# Patient Record
Sex: MALE | Employment: OTHER | URBAN - METROPOLITAN AREA
[De-identification: names, ages, dates, MRNs, and addresses within clinical notes are randomized per-mention and may not be internally consistent; named-entity substitution may affect disease eponyms.]

---

## 2018-01-08 ENCOUNTER — RADIANT APPOINTMENT (OUTPATIENT)
Dept: GENERAL RADIOLOGY | Facility: CLINIC | Age: 68
End: 2018-01-08
Attending: FAMILY MEDICINE
Payer: COMMERCIAL

## 2018-01-08 ENCOUNTER — OFFICE VISIT (OUTPATIENT)
Dept: FAMILY MEDICINE | Facility: CLINIC | Age: 68
End: 2018-01-08
Payer: COMMERCIAL

## 2018-01-08 VITALS
TEMPERATURE: 98 F | BODY MASS INDEX: 25.48 KG/M2 | SYSTOLIC BLOOD PRESSURE: 145 MMHG | HEART RATE: 67 BPM | DIASTOLIC BLOOD PRESSURE: 83 MMHG | WEIGHT: 178 LBS | OXYGEN SATURATION: 100 % | HEIGHT: 70 IN

## 2018-01-08 DIAGNOSIS — M25.552 HIP PAIN, LEFT: Primary | ICD-10-CM

## 2018-01-08 DIAGNOSIS — R10.32 LEFT GROIN PAIN: ICD-10-CM

## 2018-01-08 DIAGNOSIS — Z12.11 SCREEN FOR COLON CANCER: ICD-10-CM

## 2018-01-08 PROCEDURE — 73502 X-RAY EXAM HIP UNI 2-3 VIEWS: CPT | Mod: FY

## 2018-01-08 PROCEDURE — 99203 OFFICE O/P NEW LOW 30 MIN: CPT | Performed by: FAMILY MEDICINE

## 2018-01-08 NOTE — PROGRESS NOTES
"  SUBJECTIVE:   Jose Morocho is a 67 year old male who presents to clinic today for the following health issues:    Last seen in the clinic > 3 years ago in 2014.     Joint Pain    Onset: x1 week    Description:   Location: groin region- left side  Character: Sharp/ stiffness     Intensity: 8/10 with use of leg.  worsened after sitting for leg periods and going to standing position.     Progression of Symptoms: worse- pain has started to move down thigh    Accompanying Signs & Symptoms:  Other symptoms:weakness of left leg    History:   Previous similar pain: no       Precipitating factors:   Trauma or overuse: YES- possible from exercising- squats    Alleviating factors:  Improved by: ice- mild relief     Therapies Tried and outcome: none          Patient reports a prior history of back pain s/p back surgery about 20-25 years ago. Has been pain free since then. Denies having any back pain at this time but has ongoing left hip pain.   No recent injuries/trauma. Has not taken any OTC analgesics.     HEALTH CARE MAINTENANCE: overdue for a physical and screening tests.     Problem list and histories reviewed & adjusted, as indicated.  Additional history: as documented    Patient Active Problem List   Diagnosis     Hyperlipidemia LDL goal <160     No past surgical history on file.    Social History   Substance Use Topics     Smoking status: Never Smoker     Smokeless tobacco: Never Used     Alcohol use Yes     No family history on file.      No current outpatient prescriptions on file.     No Known Allergies      Reviewed and updated as needed this visit by clinical staff     Reviewed and updated as needed this visit by Provider         ROS:  Constitutional, HEENT, cardiovascular, pulmonary, gi and gu systems are negative, except as otherwise noted.      OBJECTIVE:   BP (P) 122/70  Pulse 67  Temp 98  F (36.7  C) (Tympanic)  Ht 5' 10\" (1.778 m)  Wt 178 lb (80.7 kg)  SpO2 100%  BMI 25.54 kg/m2  Body mass index " is 25.54 kg/(m^2).  GENERAL: healthy, alert and no distress  MS: no gross musculoskeletal defects noted, no edema  Comprehensive back pain exam:  No tenderness, Range of motion not limited by pain, Lower extremity strength functional and equal on both sides.  Lower extremity reflexes within normal limits bilaterally, Lower extremity sensation normal and equal on both sides and Straight leg raise negative bilaterally  GAIT: Antalgic gait    Diagnostic Test Results:  Xray - in process. Will notify patient with results.     ASSESSMENT/PLAN:     Jose was seen today for leg pain.    Diagnoses and all orders for this visit:    Hip pain, left, suspect degenerative arthritis  -     XR Hip Left 2-3 Views  Recommend to take OTC NSAID as needed for pain relief.   Will await Hip x ray results.     Left groin pain- groin strain versus radiating pain from degenerative hip arthritis.   Recommend to take OTC NSAID as needed for pain relief.   Will await Hip x ray results.     Screen for colon cancer  -     Fecal colorectal cancer screen (FIT); Future      Will notify him with results.     Schedule a Physical Exam at earliest convenience.       Teresa Dooley MD  [facility]

## 2018-01-08 NOTE — MR AVS SNAPSHOT
"              After Visit Summary   1/8/2018    Jose Morocho    MRN: [id number]           Patient Information     Date Of Birth          1950        Visit Information        Provider Department      1/8/2018 10:00 AM Teresa Dooley MD [facility]        Today's Diagnoses     Hip pain, left    -  1    Left groin pain        Screen for colon cancer          Care Instructions    Will notify you with x ray results.  In the interim take OTC Aleve as needed for pain relief          Follow-ups after your visit        Follow-up notes from your care team     Return for Physical Exam at earliest convenience.      Future tests that were ordered for you today     Open Future Orders        Priority Expected Expires Ordered    Fecal colorectal cancer screen (FIT) Routine 1/29/2018 4/2/2018 1/8/2018            Who to contact     Normal or non-critical lab and imaging results will be communicated to you by [employer], letter or phone within 4 business days after the clinic has received the results. If you do not hear from us within 7 days, please contact the clinic through MyChart or phone. If you have a critical or abnormal lab result, we will notify you by phone as soon as possible.  Submit refill requests through [employer] or call your pharmacy and they will forward the refill request to us. Please allow 3 business days for your refill to be completed.          If you need to speak with a  for additional information , please call: [phone]             Additional Information About Your Visit        [employer] Information     [employer] lets you send messages to your doctor, view your test results, renew your prescriptions, schedule appointments and more. To sign up, go to [email or web address] . Click on \"Log in\" on the left side of the screen, which will take you to the Welcome page. Then click on \"Sign up Now\" on the right side of the page.     You will be asked to enter the access code " "listed below, as well as some personal information. Please follow the directions to create your username and password.     Your access code is: [email or web address]  Expires: 2018 10:59 AM     Your access code will  in 90 days. If you need help or a new code, please call your [place] clinic or [phone].        Care EveryWhere ID     This is your Care EveryWhere ID. This could be used by other organizations to access your [place] medical records  [id number]        Your Vitals Were     Pulse Temperature Height Pulse Oximetry BMI (Body Mass Index)       67 98  F (36.7  C) (Tympanic) 5' 10\" (1.778 m) 100% 25.54 kg/m2        Blood Pressure from Last 3 Encounters:   18 145/83   14 138/74    Weight from Last 3 Encounters:   18 178 lb (80.7 kg)   14 211 lb (95.7 kg)              We Performed the Following     XR Hip Left 2-3 Views        Primary Care Provider Office Phone # Fax #    [facility] [phone] [id number] [facility] [address]        Equal Access to Services     LIBIA GRIFFITH AH: Hadii aad ku hadasho Soomaali, waaxda luqadaha, qaybta kaalmada adeegyada, sofia carias . So [facility] [phone].    ATENCIÓN: Si habla español, tiene a ramsey disposición servicios gratuitos de asistencia lingüística. Jamieame al [phone].    We comply with applicable federal civil rights laws and [place] laws. We do not discriminate on the basis of race, color, national origin, age, disability, sex, sexual orientation, or gender identity.            Thank you!     Thank you for choosing [facility]  for your care. Our goal is always to provide you with excellent care. Hearing back from our patients is one way we can continue to improve our services. Please take a few minutes to complete the written survey that you may receive in the mail after your visit with us. Thank you!             Your Updated Medication List - Protect others " around you: Learn how to safely use, store and throw away your medicines at www.disposemymeds.org.      Notice  As of 1/8/2018 10:59 AM    You have not been prescribed any medications.

## 2018-03-26 ENCOUNTER — OFFICE VISIT (OUTPATIENT)
Dept: FAMILY MEDICINE | Facility: CLINIC | Age: 68
End: 2018-03-26
Payer: COMMERCIAL

## 2018-03-26 VITALS
OXYGEN SATURATION: 100 % | BODY MASS INDEX: 25.91 KG/M2 | HEART RATE: 62 BPM | WEIGHT: 181 LBS | TEMPERATURE: 96.8 F | HEIGHT: 70 IN | DIASTOLIC BLOOD PRESSURE: 68 MMHG | SYSTOLIC BLOOD PRESSURE: 136 MMHG

## 2018-03-26 DIAGNOSIS — G25.3 MYOCLONIC JERKING: ICD-10-CM

## 2018-03-26 DIAGNOSIS — E55.9 VITAMIN D DEFICIENCY DISEASE: ICD-10-CM

## 2018-03-26 DIAGNOSIS — M75.41 IMPINGEMENT SYNDROME OF SHOULDER REGION, RIGHT: ICD-10-CM

## 2018-03-26 DIAGNOSIS — Z00.00 ENCOUNTER FOR ROUTINE ADULT HEALTH EXAMINATION WITHOUT ABNORMAL FINDINGS: Primary | ICD-10-CM

## 2018-03-26 DIAGNOSIS — H90.3 SENSORINEURAL HEARING LOSS, BILATERAL: ICD-10-CM

## 2018-03-26 DIAGNOSIS — Z23 NEED FOR PNEUMOCOCCAL VACCINE: ICD-10-CM

## 2018-03-26 DIAGNOSIS — Z13.220 LIPID SCREENING: ICD-10-CM

## 2018-03-26 DIAGNOSIS — Z12.11 SPECIAL SCREENING FOR MALIGNANT NEOPLASMS, COLON: ICD-10-CM

## 2018-03-26 DIAGNOSIS — Z11.59 NEED FOR HEPATITIS C SCREENING TEST: ICD-10-CM

## 2018-03-26 LAB
ANION GAP SERPL CALCULATED.3IONS-SCNC: 7 MMOL/L (ref 3–14)
BUN SERPL-MCNC: 22 MG/DL (ref 7–30)
CALCIUM SERPL-MCNC: 9 MG/DL (ref 8.5–10.1)
CHLORIDE SERPL-SCNC: 106 MMOL/L (ref 94–109)
CHOLEST SERPL-MCNC: 182 MG/DL
CO2 SERPL-SCNC: 29 MMOL/L (ref 20–32)
CREAT SERPL-MCNC: 1.09 MG/DL (ref 0.66–1.25)
GFR SERPL CREATININE-BSD FRML MDRD: 67 ML/MIN/1.7M2
GLUCOSE SERPL-MCNC: 105 MG/DL (ref 70–99)
HDLC SERPL-MCNC: 52 MG/DL
LDLC SERPL CALC-MCNC: 115 MG/DL
MAGNESIUM SERPL-MCNC: 2.3 MG/DL (ref 1.6–2.3)
NONHDLC SERPL-MCNC: 130 MG/DL
POTASSIUM SERPL-SCNC: 5.1 MMOL/L (ref 3.4–5.3)
SODIUM SERPL-SCNC: 142 MMOL/L (ref 133–144)
TRIGL SERPL-MCNC: 76 MG/DL
TSH SERPL DL<=0.005 MIU/L-ACNC: 1.6 MU/L (ref 0.4–4)

## 2018-03-26 PROCEDURE — 90670 PCV13 VACCINE IM: CPT | Performed by: INTERNAL MEDICINE

## 2018-03-26 PROCEDURE — G0009 ADMIN PNEUMOCOCCAL VACCINE: HCPCS | Performed by: INTERNAL MEDICINE

## 2018-03-26 PROCEDURE — 99397 PER PM REEVAL EST PAT 65+ YR: CPT | Mod: 25 | Performed by: INTERNAL MEDICINE

## 2018-03-26 PROCEDURE — 84443 ASSAY THYROID STIM HORMONE: CPT | Performed by: INTERNAL MEDICINE

## 2018-03-26 PROCEDURE — 36415 COLL VENOUS BLD VENIPUNCTURE: CPT | Performed by: INTERNAL MEDICINE

## 2018-03-26 PROCEDURE — 86803 HEPATITIS C AB TEST: CPT | Performed by: INTERNAL MEDICINE

## 2018-03-26 PROCEDURE — 83735 ASSAY OF MAGNESIUM: CPT | Performed by: INTERNAL MEDICINE

## 2018-03-26 PROCEDURE — 80048 BASIC METABOLIC PNL TOTAL CA: CPT | Performed by: INTERNAL MEDICINE

## 2018-03-26 PROCEDURE — 80061 LIPID PANEL: CPT | Performed by: INTERNAL MEDICINE

## 2018-03-26 PROCEDURE — 82306 VITAMIN D 25 HYDROXY: CPT | Performed by: INTERNAL MEDICINE

## 2018-03-26 RX ORDER — SODIUM PHOSPHATE,MONO-DIBASIC 19G-7G/118
1 ENEMA (ML) RECTAL DAILY
COMMUNITY

## 2018-03-26 RX ORDER — MULTIPLE VITAMINS W/ MINERALS TAB 9MG-400MCG
1 TAB ORAL DAILY
COMMUNITY

## 2018-03-26 RX ORDER — ASCORBIC ACID 250 MG
250 TABLET,CHEWABLE ORAL DAILY
COMMUNITY

## 2018-03-26 RX ORDER — CHLORAL HYDRATE 500 MG
2 CAPSULE ORAL DAILY
COMMUNITY

## 2018-03-26 ASSESSMENT — ACTIVITIES OF DAILY LIVING (ADL)
CURRENT_FUNCTION: NO ASSISTANCE NEEDED
I_NEED_ASSISTANCE_FOR_THE_FOLLOWING_DAILY_ACTIVITIES:: NO ASSISTANCE IS NEEDED

## 2018-03-26 ASSESSMENT — PAIN SCALES - GENERAL: PAINLEVEL: NO PAIN (0)

## 2018-03-26 NOTE — PROGRESS NOTES
SUBJECTIVE:   Jose Morocho is a 67 year old male who presents for Preventive Visit.  Are you in the first 12 months of your Medicare coverage?  No    Physical   Annual:     Getting at least 3 servings of Calcium per day::  Yes    Bi-annual eye exam::  NO    Dental care twice a year::  NO    Sleep apnea or symptoms of sleep apnea::  Daytime drowsiness    Diet::  Other    Frequency of exercise::  4-5 days/week    Duration of exercise::  15-30 minutes    Taking medications regularly::  Not Applicable    Additional concerns today::  No    Ability to successfully perform activities of daily living: no assistance needed  Home Safety:  Lack of grab bars in the bathroom  Hearing Impairment: difficulty following a conversation in a noisy restaurant or crowded room, difficulty following dialogue in the theater, need to ask people to speak up or repeat themselves and difficulty understanding soft or whispered speech    Right shoulder with lifting.  Lifting weights regularly   Hard to go overt he head.   No tear or snap .  Shoulder some instability . Working out over 1 year now     Semi-retired.   Selling books online     Ticks in the eyes.     Throughout life     Fall risk:  Fallen 2 or more times in the past year?: No  Any fall with injury in the past year?: No    COGNITIVE SCREEN  1) Repeat 3 items (Banana, Sunrise, Chair)    2) Clock draw: NORMAL  3) 3 item recall: Recalls 2 objects   Results: NORMAL clock, 1-2 items recalled: COGNITIVE IMPAIRMENT LESS LIKELY    Mini-CogTM Copyright VALENTINE Jena-Baptiste. Licensed by the author for use in [facility]; reprinted with permission (leodan@.[email or web address]). All rights reserved.        Reviewed and updated as needed this visit by clinical staff         Reviewed and updated as needed this visit by Provider        Social History   Substance Use Topics     Smoking status: Never Smoker     Smokeless tobacco: Never Used     Alcohol use Yes       Alcohol Use 3/26/2018   If you drink alcohol  do you typically have greater than 3 drinks per day OR greater than 7 drinks per week? No   No flowsheet data found.            Today's PHQ-2 Score:   PHQ-2 ( 1999 [employer]) 3/26/2018   Q1: Little interest or pleasure in doing things 0   Q2: Feeling down, depressed or hopeless 0   PHQ-2 Score 0   Q1: Little interest or pleasure in doing things Not at all   Q2: Feeling down, depressed or hopeless Not at all   PHQ-2 Score 0       Do you feel safe in your environment - Yes    Do you have a Health Care Directive?: No: Advance care planning reviewed with patient; information given to patient to review.    Current providers sharing in care for this patient include:   Patient Care Team:  Biju Diane as PCP - General    The following health maintenance items are reviewed in Epic and correct as of today:  Health Maintenance   Topic Date Due     HEPATITIS C SCREENING  10/15/1966     LIPID SCREEN Q5 YR MALE (SYSTEM ASSIGNED)  10/15/1983     COLON CANCER SCREEN (SYSTEM ASSIGNED)  10/15/1998     PNEUMOCOCCAL (1 of 2 - PCV13) 10/15/2013     AORTIC ANEURYSM SCREENING (SYSTEM ASSIGNED)  10/15/2013     FALL RISK ASSESSMENT  09/05/2015     INFLUENZA VACCINE (SYSTEM ASSIGNED)  09/01/2018     ADVANCE DIRECTIVE PLANNING Q5 YRS  09/05/2019     TETANUS IMMUNIZATION (SYSTEM ASSIGNED)  06/01/2021     Labs reviewed in EPIC  BP Readings from Last 3 Encounters:   03/26/18 136/68   01/08/18 (P) 122/70   09/05/14 138/74    Wt Readings from Last 3 Encounters:   03/26/18 181 lb (82.1 kg)   01/08/18 178 lb (80.7 kg)   09/05/14 211 lb (95.7 kg)                  Patient Active Problem List   Diagnosis     Hyperlipidemia LDL goal <160     Past Surgical History:   Procedure Laterality Date     BACK SURGERY  1995    Herniated disk     EYE SURGERY  2011    Lens implants, both eyes     ORTHOPEDIC SURGERY  2000    Meniscus - right knee       Social History   Substance Use Topics     Smoking status: Never Smoker     Smokeless tobacco: Never Used  "    Alcohol use 0.0 oz/week     0 Standard drinks or equivalent per week      Comment: moderate use in the past     Family History   Problem Relation Age of Onset     DIABETES Paternal Grandmother      Hypertension Father      CEREBROVASCULAR DISEASE Father      Other Cancer Mother      Lung, smoker         Current Outpatient Prescriptions   Medication Sig Dispense Refill     ascorbic acid (VITAMIN C) 250 MG CHEW chewable tablet Take 250 mg by mouth daily       glucosamine-chondroitin 500-400 MG CAPS per capsule Take 1 capsule by mouth daily       multivitamin, therapeutic with minerals (MULTI-VITAMIN) TABS tablet Take 1 tablet by mouth daily       fish oil-omega-3 fatty acids 1000 MG capsule Take 2 g by mouth daily       VITAMIN D, CHOLECALCIFEROL, PO Take 1,000 Units by mouth daily       No Known Allergies    Pneumonia Vaccine:Adults age 65+ who have not received previous Pneumovax (PPSV23) or PCV13 as an adult: Should first be given PCV13 AND then should be given PPSV23 6-12 months after PCV13    Review of Systems  Constitutional, HEENT, cardiovascular, pulmonary, gi and gu systems are negative, except as otherwise noted.    OBJECTIVE:   There were no vitals taken for this visit. Estimated body mass index is 25.54 kg/(m^2) as calculated from the following:    Height as of 1/8/18: 5' 10\" (1.778 m).    Weight as of 1/8/18: 178 lb (80.7 kg).  Physical Exam  GENERAL: healthy, alert and no distress  EYES: Eyes grossly normal to inspection, PERRL and conjunctivae and sclerae normal  HENT: ear canals and TM's normal, nose and mouth without ulcers or lesions  NECK: no adenopathy, no asymmetry, masses, or scars and thyroid normal to palpation  RESP: lungs clear to auscultation - no rales, rhonchi or wheezes  CV: regular rate and rhythm, normal S1 S2, no S3 or S4, no murmur, click or rub, no peripheral edema and peripheral pulses strong  ABDOMEN: soft, nontender, no hepatosplenomegaly, no masses and bowel sounds " "normal  MS: no gross musculoskeletal defects noted, no edema  SKIN: no suspicious lesions or rashes  NEURO: Normal strength and tone, mentation intact and speech normal  BACK: no CVA tenderness, no paralumbar tenderness  PSYCH: mentation appears normal, affect normal/bright  LYMPH: no cervical, supraclavicular, axillary, or inguinal adenopathy    ASSESSMENT / PLAN:       ICD-10-CM    1. Encounter for routine adult health examination without abnormal findings Z00.00    2. Special screening for malignant neoplasms, colon Z12.11 Fecal colorectal cancer screen (FIT)   3. Myoclonic jerking G25.3 Basic metabolic panel     TSH with free T4 reflex     Magnesium   4. Vitamin D deficiency disease E55.9 Vitamin D Deficiency   5. Need for pneumococcal vaccine Z23 PNEUMOCOCCAL CONJ VACCINE 13 VALENT IM     VACCINE ADMINISTRATION, INITIAL   6. Lipid screening Z13.220 Lipid panel reflex to direct LDL Fasting   7. Need for hepatitis C screening test Z11.59 Hepatitis C antibody   8. Impingement syndrome of shoulder region, right M75.41 [facility] PT, HAND, AND CHIROPRACTIC REFERRAL   9. Sensorineural hearing loss, bilateral H90.3 AUDIOLOGY ADULT REFERRAL       End of Life Planning:  Patient currently has an advanced directive: No.  I have verified the patient's ablity to prepare an advanced directive/make health care decisions.  Literature was provided to assist patient in preparing an advanced directive.    COUNSELING:  Reviewed preventive health counseling, as reflected in patient instructions       Consider AAA screening for ages 65-75 and smoking history       Regular exercise       Healthy diet/nutrition       Vision screening       Hearing screening       Dental care       Hepatitis C screening       Colon cancer screening        Estimated body mass index is 25.54 kg/(m^2) as calculated from the following:    Height as of 1/8/18: 5' 10\" (1.778 m).    Weight as of 1/8/18: 178 lb (80.7 kg).     reports that he has never smoked. He has " never used smokeless tobacco.      Appropriate preventive services were discussed with this patient, including applicable screening as appropriate for cardiovascular disease, diabetes, osteopenia/osteoporosis, and glaucoma.  As appropriate for age/gender, discussed screening for colorectal cancer, prostate cancer, breast cancer, and cervical cancer. Checklist reviewing preventive services available has been given to the patient.    Reviewed patients plan of care and provided an AVS. The Basic Care Plan (routine screening as documented in Health Maintenance) for Jose meets the Care Plan requirement. This Care Plan has been established and reviewed with the Patient.    ICD-10-CM    1. Encounter for routine adult health examination without abnormal findings Z00.00    2. Special screening for malignant neoplasms, colon Z12.11 Fecal colorectal cancer screen (FIT)   3. Myoclonic jerking G25.3 Basic metabolic panel     TSH with free T4 reflex     Magnesium   4. Vitamin D deficiency disease E55.9 Vitamin D Deficiency   5. Need for pneumococcal vaccine Z23 PNEUMOCOCCAL CONJ VACCINE 13 VALENT IM     VACCINE ADMINISTRATION, INITIAL   6. Lipid screening Z13.220 Lipid panel reflex to direct LDL Fasting   7. Need for hepatitis C screening test Z11.59 Hepatitis C antibody   8. Impingement syndrome of shoulder region, right M75.41 [facility] PT, HAND, AND CHIROPRACTIC REFERRAL   9. Sensorineural hearing loss, bilateral H90.3 AUDIOLOGY ADULT REFERRAL       Counseling Resources:  ATP IV Guidelines  Pooled Cohorts Equation Calculator  Breast Cancer Risk Calculator  FRAX Risk Assessment  ICSI Preventive Guidelines  Dietary Guidelines for Americans, 2010  USDA's MyPlate  ASA Prophylaxis  Lung CA Screening    Damion Garcia MD  [facility]

## 2018-03-26 NOTE — NURSING NOTE
Prior to injection verified patient identity using patient's name and date of birth.  Brea Thurman MA    Due to injection administration, patient instructed to remain in clinic for 15 minutes  afterwards, and to report any adverse reaction to me immediately.  Brea Thurman MA

## 2018-03-26 NOTE — NURSING NOTE
"Chief Complaint   Patient presents with     Physical     Health Maintenance     Fall risk       Initial /68 (BP Location: Right arm, Patient Position: Chair, Cuff Size: Adult Regular)  Pulse 62  Temp 96.8  F (36  C) (Oral)  Ht 5' 10\" (1.778 m)  Wt 181 lb (82.1 kg)  SpO2 100%  BMI 25.97 kg/m2 Estimated body mass index is 25.97 kg/(m^2) as calculated from the following:    Height as of this encounter: 5' 10\" (1.778 m).    Weight as of this encounter: 181 lb (82.1 kg).  Medication Reconciliation: complete   Brea Thurman MA      "

## 2018-03-26 NOTE — MR AVS SNAPSHOT
After Visit Summary   3/26/2018    Jose Morocho    MRN: [id number]           Patient Information     Date Of Birth          1950        Visit Information        Provider Department      3/26/2018 9:00 AM Damion Garcia MD [facility]        Today's Diagnoses     Encounter for routine adult health examination without abnormal findings    -  1    Special screening for malignant neoplasms, colon        Myoclonic jerking        Vitamin D deficiency disease        Need for pneumococcal vaccine        Lipid screening        Need for hepatitis C screening test        Impingement syndrome of shoulder region, right        Sensorineural hearing loss, bilateral          Care Instructions      Preventive Health Recommendations:       Male Ages 65 and over    Yearly exam:             See your health care provider every year in order to  o   Review health changes.   o   Discuss preventive care.    o   Review your medicines if your doctor has prescribed any.    Talk with your health care provider about whether you should have a test to screen for prostate cancer (PSA).    Every 3 years, have a diabetes test (fasting glucose). If you are at risk for diabetes, you should have this test more often.    Every 5 years, have a cholesterol test. Have this test more often if you are at risk for high cholesterol or heart disease.     Every 10 years, have a colonoscopy. Or, have a yearly FIT test (stool test). These exams will check for colon cancer.    Talk to with your health care provider about screening for Abdominal Aortic Aneurysm if you have a family history of AAA or have a history of smoking.  Shots:     Get a flu shot each year.     Get a tetanus shot every 10 years.     Talk to your doctor about your pneumonia vaccines. There are now two you should receive - Pneumovax (PPSV 23) and Prevnar (PCV 13).    Talk to your doctor about a shingles vaccine.     Talk to your doctor about the  hepatitis B vaccine.  Nutrition:     Eat at least 5 servings of fruits and vegetables each day.     Eat whole-grain bread, whole-wheat pasta and brown rice instead of white grains and rice.     Talk to your doctor about Calcium and Vitamin D.   Lifestyle    Exercise for at least 150 minutes a week (30 minutes a day, 5 days a week). This will help you control your weight and prevent disease.     Limit alcohol to one drink per day.     No smoking.     Wear sunscreen to prevent skin cancer.     See your dentist every six months for an exam and cleaning.     See your eye doctor every 1 to 2 years to screen for conditions such as glaucoma, macular degeneration and cataracts.          Follow-ups after your visit        Additional Services     AUDIOLOGY ADULT REFERRAL       Your provider has referred you to: [facility]: [facility] [place] [phone]   [email or web address]    Treatment:  Evaluation & Treatment  Specialty Testing:  Hearing Aid Consultation    Please be aware that coverage of these services is subject to the terms and limitations of your health insurance plan.  Call member services at your health plan with any benefit or coverage questions.      Please bring the following to your appointment:  >>   Any x-rays, CTs or MRIs which have been performed.  Contact the facility where they were done to arrange for  prior to your scheduled appointment.   >>   List of current medications  >>   This referral request   >>   Any documents/labs given to you for this referral            [facility] PT, HAND, AND CHIROPRACTIC REFERRAL       **This order will print in the [facility] Scheduling Office**    Physical Therapy, Hand Therapy and Chiropractic Care are available through:    *[facility]  *[facility]  *[facility]    Call one number to schedule at any of the above locations: [phone].    Your provider has referred you to: Physical  Therapy at [facility] or [facility]    Indication/Reason for Referral: Shoulder Pain  Onset of Illness: 1 year  Therapy Orders: Evaluate and Treat  Special Programs: None  Special Request: None    Jef Fuchs      Additional Comments for the Therapist or Chiropractor:     Please be aware that coverage of these services is subject to the terms and limitations of your health insurance plan.  Call member services at your health plan with any benefit or coverage questions.      Please bring the following to your appointment:    *Your personal calendar for scheduling future appointments  *Comfortable clothing                  Future tests that were ordered for you today     Open Future Orders        Priority Expected Expires Ordered    Fecal colorectal cancer screen (FIT) Routine 4/16/2018 6/18/2018 3/26/2018            Who to contact     If you have questions or need follow up information about today's clinic visit or your schedule please contact [facility] directly at [phone].  Normal or non-critical lab and imaging results will be communicated to you by [employer], letter or phone within 4 business days after the clinic has received the results. If you do not hear from us within 7 days, please contact the clinic through An[employer] or phone. If you have a critical or abnormal lab result, we will notify you by phone as soon as possible.  Submit refill requests through [employer] or call your pharmacy and they will forward the refill request to us. Please allow 3 business days for your refill to be completed.          Additional Information About Your Visit        Typemock Information     [employer] gives you secure access to your electronic health record. If you see a primary care provider, you can also send messages to your care team and make appointments. If you have questions, please call your primary care clinic.  If you do not have a primary care provider, please call [phone] and they will assist you.    "     Care EveryWhere ID     This is your Care EveryWhere ID. This could be used by other organizations to access your [place] medical records  [id number]        Your Vitals Were     Pulse Temperature Height Pulse Oximetry BMI (Body Mass Index)       62 96.8  F (36  C) (Oral) 5' 10\" (1.778 m) 100% 25.97 kg/m2        Blood Pressure from Last 3 Encounters:   03/26/18 136/68   01/08/18 (P) 122/70   09/05/14 138/74    Weight from Last 3 Encounters:   03/26/18 181 lb (82.1 kg)   01/08/18 178 lb (80.7 kg)   09/05/14 211 lb (95.7 kg)              We Performed the Following     AUDIOLOGY ADULT REFERRAL     Basic metabolic panel     Hepatitis C antibody     [facility], HAND, AND CHIROPRACTIC REFERRAL     Lipid panel reflex to direct LDL Fasting     Magnesium     PNEUMOCOCCAL CONJ VACCINE 13 VALENT IM     TSH with free T4 reflex     VACCINE ADMINISTRATION, INITIAL     Vitamin D Deficiency        Primary Care Provider Office Phone # Fax #    [facility] [phone] [address] [facility] [address]        Equal Access to Services     KELLY GRIFFITH : Hadii aad ku hadasho Soomaali, waaxda luqadaha, qaybta kaalmada adepadmajayajulian, sofia carias . So [facility] [phone].    ATENCIÓN: Si habla español, tiene a ramsey disposición servicios gratuitos de asistencia lingüística. [facility] [phone].    We comply with applicable federal civil rights laws and [place] laws. We do not discriminate on the basis of race, color, national origin, age, disability, sex, sexual orientation, or gender identity.            Thank you!     Thank you for choosing [facility]  for your care. Our goal is always to provide you with excellent care. Hearing back from our patients is one way we can continue to improve our services. Please take a few minutes to complete the written survey that you may receive in the mail after your visit with us. Thank you!             Your Updated " Medication List - Protect others around you: Learn how to safely use, store and throw away your medicines at www.disposemymeds.org.          This list is accurate as of 3/26/18  9:36 AM.  Always use your most recent med list.                   Brand Name Dispense Instructions for use Diagnosis    ascorbic acid 250 MG Chew chewable tablet    vitamin C     Take 250 mg by mouth daily        fish oil-omega-3 fatty acids 1000 MG capsule      Take 2 g by mouth daily        glucosamine-chondroitin 500-400 MG Caps per capsule      Take 1 capsule by mouth daily        Multi-vitamin Tabs tablet      Take 1 tablet by mouth daily        VITAMIN D (CHOLECALCIFEROL) PO      Take 1,000 Units by mouth daily

## 2018-03-27 LAB
DEPRECATED CALCIDIOL+CALCIFEROL SERPL-MC: 47 UG/L (ref 20–75)
HCV AB SERPL QL IA: NONREACTIVE

## 2018-03-28 ENCOUNTER — THERAPY VISIT (OUTPATIENT)
Dept: PHYSICAL THERAPY | Facility: CLINIC | Age: 68
End: 2018-03-28
Payer: COMMERCIAL

## 2018-03-28 DIAGNOSIS — M25.511 ACUTE PAIN OF RIGHT SHOULDER: Primary | ICD-10-CM

## 2018-03-28 PROCEDURE — 97161 PT EVAL LOW COMPLEX 20 MIN: CPT | Mod: GP | Performed by: PHYSICAL THERAPIST

## 2018-03-28 PROCEDURE — G8985 CARRY GOAL STATUS: HCPCS | Mod: GP | Performed by: PHYSICAL THERAPIST

## 2018-03-28 PROCEDURE — G8984 CARRY CURRENT STATUS: HCPCS | Mod: GP | Performed by: PHYSICAL THERAPIST

## 2018-03-28 PROCEDURE — 97110 THERAPEUTIC EXERCISES: CPT | Mod: GP | Performed by: PHYSICAL THERAPIST

## 2018-03-28 NOTE — PROGRESS NOTES
[facility] Initial Evaluation  Subjective:  Patient is a 67 year old male presenting with rehab right shoulder hpi.   Jose Morocho is a 67 year old male with a right shoulder condition.  Condition occurred with:  Repetition/overuse.  Condition occurred: during recreation/sport and at home.  This is a new condition  3/26/18 patient received MD orders for right shoulder pain that flared up about 1 month ago but had originally started about 1 year ago when he started doing weight training with kettlebells.  It had been doing pretty well prior to the past month..    Patient reports pain:  Anterior.    Pain is described as sharp and aching and is intermittent and reported as 3/10.  Associated symptoms:  Loss of strength and loss of motion/stiffness. Pain is worse during the day.  Symptoms are exacerbated by using arm overhead, using arm behind back and lifting and relieved by rest.  Since onset symptoms are gradually improving.        General health as reported by patient is good.  Pertinent medical history includes:  History of fractures.  Medical allergies: no.  Other surgeries include:  None reported.  Current medications:  None as reported by the patient.  Current occupation is retired.    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    Barriers include:  None as reported by the patient.    Red flags:  None as reported by the patient.                        Objective:  SHOULDER:   PROM L PROM R AROM L AROM R MMT L MMT R   Flex   180 180 5/5 5-/5   Abd   180 180 5/5 5-/5   Full Can     5/5 5-/5   Empty Can     5/5 5-/5, mild pain   IR 50 (sleeper) 32 (sleeper)   5/5 5/5   ER   85 85 5/5 5-/5   Ext/IR   T6 T10       Scapulothoraic Rhythm: Right scapular dyskinesis, prominent inferior angle and medial border at rest    Palpation: no tenderness to palpation    Special tests:   L R   Impingement     Neer's - -   Hawkin's-Rogers - +   Coracoid Impingement - +   Internal  impingement - -   Labral     Anterior Slide - -   [facility] - -   Biceps      Speed's - -   Rotator Cuff Tear     Drop Arm - -   Belly Press - -   Lift off  - -       System    Physical Exam    General     ROS    Assessment/Plan:    Patient is a 67 year old male with right side shoulder complaints.    Patient has the following significant findings with corresponding treatment plan.                Diagnosis 1:  Right shoulder pain due to impingement    Pain -  hot/cold therapy, manual therapy, self management, education and home program  Decreased ROM/flexibility - manual therapy, therapeutic exercise and home program  Decreased strength - therapeutic exercise, therapeutic activities and home program  Decreased proprioception - neuro re-education, therapeutic activities and home program  Decreased function - therapeutic activities and home program  Impaired posture - neuro re-education and home program    Therapy Evaluation Codes:   1) History comprised of:   Personal factors that impact the plan of care:      None.    Comorbidity factors that impact the plan of care are:      None.     Medications impacting care: None.  2) Examination of Body Systems comprised of:   Body structures and functions that impact the plan of care:      Shoulder.   Activity limitations that impact the plan of care are:      Bathing, Dressing, Lifting and Throwing.  3) Clinical presentation characteristics are:   Stable/Uncomplicated.  4) Decision-Making    Low complexity using standardized patient assessment instrument and/or measureable assessment of functional outcome.  Cumulative Therapy Evaluation is: Low complexity.    Previous and current functional limitations:  (See Goal Flow Sheet for this information)    Short term and Long term goals: (See Goal Flow Sheet for this information)     Communication ability:  Patient appears to be able to clearly communicate and understand verbal and written communication and follow directions  correctly.  Treatment Explanation - The following has been discussed with the patient:   RX ordered/plan of care  Anticipated outcomes  Possible risks and side effects  This patient would benefit from PT intervention to resume normal activities.   Rehab potential is good.    Frequency:  1 X week, once daily  Duration:  for 8 weeks  Discharge Plan:  Achieve all LTG.  Independent in home treatment program.  Reach maximal therapeutic benefit.    Please refer to the daily flowsheet for treatment today, total treatment time and time spent performing 1:1 timed codes.

## 2018-04-04 ENCOUNTER — THERAPY VISIT (OUTPATIENT)
Dept: PHYSICAL THERAPY | Facility: CLINIC | Age: 68
End: 2018-04-04
Payer: COMMERCIAL

## 2018-04-04 DIAGNOSIS — M25.511 ACUTE PAIN OF RIGHT SHOULDER: ICD-10-CM

## 2018-04-04 PROCEDURE — 97112 NEUROMUSCULAR REEDUCATION: CPT | Mod: GP | Performed by: PHYSICAL THERAPIST

## 2018-04-04 PROCEDURE — 97010 HOT OR COLD PACKS THERAPY: CPT | Mod: GP | Performed by: PHYSICAL THERAPIST

## 2018-04-04 PROCEDURE — 97140 MANUAL THERAPY 1/> REGIONS: CPT | Mod: GP | Performed by: PHYSICAL THERAPIST

## 2018-04-04 PROCEDURE — 97110 THERAPEUTIC EXERCISES: CPT | Mod: GP | Performed by: PHYSICAL THERAPIST

## 2018-04-11 ENCOUNTER — THERAPY VISIT (OUTPATIENT)
Dept: PHYSICAL THERAPY | Facility: CLINIC | Age: 68
End: 2018-04-11
Payer: COMMERCIAL

## 2018-04-11 DIAGNOSIS — M25.511 ACUTE PAIN OF RIGHT SHOULDER: ICD-10-CM

## 2018-04-11 PROCEDURE — 97140 MANUAL THERAPY 1/> REGIONS: CPT | Mod: GP | Performed by: PHYSICAL THERAPIST

## 2018-04-11 PROCEDURE — 97112 NEUROMUSCULAR REEDUCATION: CPT | Mod: GP | Performed by: PHYSICAL THERAPIST

## 2018-04-11 PROCEDURE — 97110 THERAPEUTIC EXERCISES: CPT | Mod: GP | Performed by: PHYSICAL THERAPIST

## 2018-04-20 ENCOUNTER — TELEPHONE (OUTPATIENT)
Dept: FAMILY MEDICINE | Facility: CLINIC | Age: 68
End: 2018-04-20

## 2018-04-20 NOTE — TELEPHONE ENCOUNTER
4-20-18 Called and left message to complete and return Fit test      [facility] X-ray tech

## 2018-04-25 ENCOUNTER — THERAPY VISIT (OUTPATIENT)
Dept: PHYSICAL THERAPY | Facility: CLINIC | Age: 68
End: 2018-04-25
Payer: COMMERCIAL

## 2018-04-25 DIAGNOSIS — M25.511 ACUTE PAIN OF RIGHT SHOULDER: ICD-10-CM

## 2018-04-25 PROCEDURE — 97112 NEUROMUSCULAR REEDUCATION: CPT | Mod: GP | Performed by: PHYSICAL THERAPIST

## 2018-04-25 PROCEDURE — 97110 THERAPEUTIC EXERCISES: CPT | Mod: GP | Performed by: PHYSICAL THERAPIST

## 2018-05-16 ENCOUNTER — TELEPHONE (OUTPATIENT)
Dept: GENERAL RADIOLOGY | Facility: CLINIC | Age: 68
End: 2018-05-16

## 2018-05-16 NOTE — TELEPHONE ENCOUNTER
5-16-18 Called and left message to complete and return Fit test      [facility] X-ray tech

## 2018-05-23 ENCOUNTER — THERAPY VISIT (OUTPATIENT)
Dept: PHYSICAL THERAPY | Facility: CLINIC | Age: 68
End: 2018-05-23
Payer: COMMERCIAL

## 2018-05-23 DIAGNOSIS — M25.511 ACUTE PAIN OF RIGHT SHOULDER: ICD-10-CM

## 2018-05-23 PROCEDURE — 97110 THERAPEUTIC EXERCISES: CPT | Mod: GP | Performed by: PHYSICAL THERAPIST

## 2018-05-23 PROCEDURE — 97112 NEUROMUSCULAR REEDUCATION: CPT | Mod: GP | Performed by: PHYSICAL THERAPIST

## 2018-05-23 NOTE — LETTER
[facility]  [address]  [place] [address]  [phone]    May 23, 2018    Re: Jose Morocho   :   1950  MRN:  [id number]   REFERRING PHYSICIAN:   [facility]    Date of Initial Evaluation:  3/28/18  Visits:  Rxs Used: 5  Reason for Referral:  Acute pain of right shoulder    DISCHARGE REPORT  Progress reporting period is from 3/28/18 to 18.       SUBJECTIVE  Subjective changes noted by patient: Jose reports his shoulder overall is better than it was before starting PT as he has resumed the majority of his kettle bell workouts.  He has, however, had to modify his workouts somewhat as certain positions continue to cause pain in his clavicle area.  He feels stronger and more stable in his posterior shoulder and in the sholder joint, but the clavicle and SC joint continue to feel pain when he puts weight on his right arm if it is out to the side.  He feels he can continue his home program on his own moving forward.    Current pain level is  1/10.     Previous pain level was   3/10.   Changes in function:  Yes (See Goal flowsheet attached for changes in current functional level)  Adverse reaction to treatment or activity: None    OBJECTIVE  Changes noted in objective findings:  Yes  Right shoulder AROM full in all planes, mild clavicle soreness at end range flexion.    Right shoulder passive IR:  65 on right vs 75 on left    Right shoulder strength 5/5 in all planes.    Right inferior scapular angle prominent with hands by sides and on hips.     ASSESSMENT/PLAN  Updated problem list and treatment plan: Diagnosis 1:  R shoulder pain    Pain -  home program  Decreased ROM/flexibility - home program  Decreased strength - home program  Decreased proprioception - home program  Decreased function - home program  STG/LTGs have been met or progress has been made towards goals:  Yes (See Goal flow sheet completed today.)  Assessment of Progress: The  patient's condition is improving.  The patient's condition has potential to improve.  Self Management Plans:  Patient has been instructed in a home treatment program.  Patient  has been instructed in self management of symptoms.    Jose continues to require the following intervention to meet STG and LTG's:  HEP    Recommendations:  This patient is ready to be discharged from therapy and continue their home treatment program.    Thank you for your referral.    INQUIRIES  Therapist: Saturnino Bazan DPT, [facility]  [address]  [place] [address]  Phone: [phone]  Fax: [phone]

## 2018-05-23 NOTE — PROGRESS NOTES
Subjective:  HPI                    Objective:  System    Physical Exam    General     ROS    Assessment/Plan:    DISCHARGE REPORT    Progress reporting period is from 3/28/18 to 5/23/18.       SUBJECTIVE  Subjective changes noted by patient: Jose reports his shoulder overall is better than it was before starting PT as he has resumed the majority of his kettle bell workouts.  He has, however, had to modify his workouts somewhat as certain positions continue to cause pain in his clavicle area.  He feels stronger and more stable in his posterior shoulder and in the sholder joint, but the clavicle and SC joint continue to feel pain when he puts weight on his right arm if it is out to the side.  He feels he can continue his home program on his own moving forward.    Current pain level is  1/10.     Previous pain level was   3/10.   Changes in function:  Yes (See Goal flowsheet attached for changes in current functional level)  Adverse reaction to treatment or activity: None    OBJECTIVE  Changes noted in objective findings:  Yes  Right shoulder AROM full in all planes, mild clavicle soreness at end range flexion.    Right shoulder passive IR:  65 on right vs 75 on left    Right shoulder strength 5/5 in all planes.    Right inferior scapular angle prominent with hands by sides and on hips.     ASSESSMENT/PLAN  Updated problem list and treatment plan: Diagnosis 1:  R shoulder pain    Pain -  home program  Decreased ROM/flexibility - home program  Decreased strength - home program  Decreased proprioception - home program  Decreased function - home program  STG/LTGs have been met or progress has been made towards goals:  Yes (See Goal flow sheet completed today.)  Assessment of Progress: The patient's condition is improving.  The patient's condition has potential to improve.  Self Management Plans:  Patient has been instructed in a home treatment program.  Patient  has been instructed in self management of  symptoms.    Jose continues to require the following intervention to meet STG and LTG's:  HEP    Recommendations:  This patient is ready to be discharged from therapy and continue their home treatment program.    Please refer to the daily flowsheet for treatment today, total treatment time and time spent performing 1:1 timed codes.

## 2019-01-25 PROBLEM — M25.511 ACUTE PAIN OF RIGHT SHOULDER: Status: RESOLVED | Noted: 2018-03-28 | Resolved: 2019-01-25

## 2020-02-08 ENCOUNTER — HEALTH MAINTENANCE LETTER (OUTPATIENT)
Age: 70
End: 2020-02-08

## 2020-11-07 ENCOUNTER — HEALTH MAINTENANCE LETTER (OUTPATIENT)
Age: 70
End: 2020-11-07

## 2021-03-04 ENCOUNTER — IMMUNIZATION (OUTPATIENT)
Dept: PEDIATRICS | Facility: CLINIC | Age: 71
End: 2021-03-04
Payer: COMMERCIAL

## 2021-03-04 PROCEDURE — 91300 PR COVID VAC PFIZER DIL RECON 30 MCG/0.3 ML IM: CPT

## 2021-03-04 PROCEDURE — 0001A PR COVID VAC PFIZER DIL RECON 30 MCG/0.3 ML IM: CPT

## 2021-03-25 ENCOUNTER — IMMUNIZATION (OUTPATIENT)
Dept: PEDIATRICS | Facility: CLINIC | Age: 71
End: 2021-03-25
Attending: INTERNAL MEDICINE
Payer: COMMERCIAL

## 2021-03-25 PROCEDURE — 91300 PR COVID VAC PFIZER DIL RECON 30 MCG/0.3 ML IM: CPT

## 2021-03-25 PROCEDURE — 0002A PR COVID VAC PFIZER DIL RECON 30 MCG/0.3 ML IM: CPT

## 2021-03-27 ENCOUNTER — HEALTH MAINTENANCE LETTER (OUTPATIENT)
Age: 71
End: 2021-03-27

## 2021-09-11 ENCOUNTER — HEALTH MAINTENANCE LETTER (OUTPATIENT)
Age: 71
End: 2021-09-11

## 2022-04-23 ENCOUNTER — HEALTH MAINTENANCE LETTER (OUTPATIENT)
Age: 72
End: 2022-04-23

## 2022-07-13 ASSESSMENT — ENCOUNTER SYMPTOMS
DYSURIA: 0
PARESTHESIAS: 0
CONSTIPATION: 0
NERVOUS/ANXIOUS: 0
NAUSEA: 0
HEADACHES: 0
PALPITATIONS: 0
JOINT SWELLING: 0
ARTHRALGIAS: 1
MYALGIAS: 1
EYE PAIN: 0
CHILLS: 0
WEAKNESS: 1
DIARRHEA: 0
FEVER: 0
FREQUENCY: 0
SORE THROAT: 0
COUGH: 1
HEARTBURN: 0
HEMATOCHEZIA: 0
HEMATURIA: 0
SHORTNESS OF BREATH: 0
DIZZINESS: 0
ABDOMINAL PAIN: 0

## 2022-07-13 ASSESSMENT — ACTIVITIES OF DAILY LIVING (ADL): CURRENT_FUNCTION: NO ASSISTANCE NEEDED

## 2022-07-20 ENCOUNTER — OFFICE VISIT (OUTPATIENT)
Dept: FAMILY MEDICINE | Facility: CLINIC | Age: 72
End: 2022-07-20
Payer: COMMERCIAL

## 2022-07-20 VITALS
HEART RATE: 57 BPM | BODY MASS INDEX: 25.73 KG/M2 | SYSTOLIC BLOOD PRESSURE: 130 MMHG | DIASTOLIC BLOOD PRESSURE: 70 MMHG | WEIGHT: 190 LBS | HEIGHT: 72 IN | OXYGEN SATURATION: 98 % | TEMPERATURE: 98.6 F

## 2022-07-20 DIAGNOSIS — Z12.11 COLON CANCER SCREENING: ICD-10-CM

## 2022-07-20 DIAGNOSIS — Z23 NEED FOR IMMUNIZATION AGAINST TETANUS ALONE: Primary | ICD-10-CM

## 2022-07-20 DIAGNOSIS — G89.29 CHRONIC PAIN OF LEFT KNEE: ICD-10-CM

## 2022-07-20 DIAGNOSIS — E78.5 HYPERLIPIDEMIA LDL GOAL <160: ICD-10-CM

## 2022-07-20 DIAGNOSIS — M25.562 CHRONIC PAIN OF LEFT KNEE: ICD-10-CM

## 2022-07-20 LAB
ANION GAP SERPL CALCULATED.3IONS-SCNC: 4 MMOL/L (ref 3–14)
BUN SERPL-MCNC: 19 MG/DL (ref 7–30)
CALCIUM SERPL-MCNC: 8.9 MG/DL (ref 8.5–10.1)
CHLORIDE BLD-SCNC: 109 MMOL/L (ref 94–109)
CHOLEST SERPL-MCNC: 252 MG/DL
CO2 SERPL-SCNC: 28 MMOL/L (ref 20–32)
CREAT SERPL-MCNC: 1.01 MG/DL (ref 0.66–1.25)
FASTING STATUS PATIENT QL REPORTED: YES
GFR SERPL CREATININE-BSD FRML MDRD: 79 ML/MIN/1.73M2
GLUCOSE BLD-MCNC: 107 MG/DL (ref 70–99)
HDLC SERPL-MCNC: 62 MG/DL
LDLC SERPL CALC-MCNC: 176 MG/DL
NONHDLC SERPL-MCNC: 190 MG/DL
POTASSIUM BLD-SCNC: 5.5 MMOL/L (ref 3.4–5.3)
SODIUM SERPL-SCNC: 141 MMOL/L (ref 133–144)
TRIGL SERPL-MCNC: 72 MG/DL

## 2022-07-20 PROCEDURE — G0438 PPPS, INITIAL VISIT: HCPCS | Performed by: INTERNAL MEDICINE

## 2022-07-20 PROCEDURE — 36415 COLL VENOUS BLD VENIPUNCTURE: CPT | Performed by: INTERNAL MEDICINE

## 2022-07-20 PROCEDURE — 80061 LIPID PANEL: CPT | Performed by: INTERNAL MEDICINE

## 2022-07-20 PROCEDURE — 80048 BASIC METABOLIC PNL TOTAL CA: CPT | Performed by: INTERNAL MEDICINE

## 2022-07-20 PROCEDURE — 90471 IMMUNIZATION ADMIN: CPT | Performed by: INTERNAL MEDICINE

## 2022-07-20 PROCEDURE — 90714 TD VACC NO PRESV 7 YRS+ IM: CPT | Performed by: INTERNAL MEDICINE

## 2022-07-20 ASSESSMENT — ENCOUNTER SYMPTOMS
PALPITATIONS: 0
CHILLS: 0
EYE PAIN: 0
NERVOUS/ANXIOUS: 0
NAUSEA: 0
HEADACHES: 0
CONSTIPATION: 0
FEVER: 0
HEMATOCHEZIA: 0
WEAKNESS: 1
FREQUENCY: 0
HEARTBURN: 0
HEMATURIA: 0
PARESTHESIAS: 0
DIZZINESS: 0
DYSURIA: 0
ABDOMINAL PAIN: 0
JOINT SWELLING: 0
ARTHRALGIAS: 1
SHORTNESS OF BREATH: 0
MYALGIAS: 1
DIARRHEA: 0
COUGH: 1
SORE THROAT: 0

## 2022-07-20 ASSESSMENT — ACTIVITIES OF DAILY LIVING (ADL): CURRENT_FUNCTION: NO ASSISTANCE NEEDED

## 2022-07-20 NOTE — PROGRESS NOTES
"SUBJECTIVE:   Jose Morocho is a 71 year old male who presents for Preventive Visit.      Patient has been advised of split billing requirements and indicates understanding: Yes  Are you in the first 12 months of your Medicare coverage?  No    Healthy Habits:     In general, how would you rate your overall health?  Good    Frequency of exercise:  4-5 days/week    Duration of exercise:  15-30 minutes    Do you usually eat at least 4 servings of fruit and vegetables a day, include whole grains    & fiber and avoid regularly eating high fat or \"junk\" foods?  Yes    Taking medications regularly:  Not Applicable    Ability to successfully perform activities of daily living:  No assistance needed    Home Safety:  Lack of grab bars in the bathroom    Hearing Impairment:  Feel that people are mumbling or not speaking clearly, need to ask people to speak up or repeat themselves and difficulty understanding soft or whispered speech    In the past 6 months, have you been bothered by leaking of urine?  No    In general, how would you rate your overall mental or emotional health?  Good      PHQ-2 Total Score: 0    Additional concerns today:  Yes    Do you feel safe in your environment? Yes    Have you ever done Advance Care Planning? (For example, a Health Directive, POLST, or a discussion with a medical provider or your loved ones about your wishes): No, advance care planning information given to patient to review.  Patient plans to discuss their wishes with loved ones or provider.       around the knee and then injred tripping during shipple  Left side   Infrapatellar and suprapetallar   Muscular pain in the back and around  The sides   Herniated suki in the back as well     l4 l5 nerve sensitivty     No new health concerns   Sinus drainage ( exercise ) - swollowing gunk -     Fall risk  Fallen 2 or more times in the past year?: No  Any fall with injury in the past year?: No    Cognitive Screening   1) Repeat 3 items " (Leader, Season, Table)    2) Clock draw: NORMAL  3) 3 item recall: Recalls 2 objects   Results: NORMAL clock, 1-2 items recalled: COGNITIVE IMPAIRMENT LESS LIKELY    Mini-CogTM Copyright VALENTINE Jean-Baptiste. Licensed by the author for use in [facility]; reprinted with permission (leodan@[facility]). All rights reserved.      Do you have sleep apnea, excessive snoring or daytime drowsiness?: yes    Reviewed and updated as needed this visit by clinical staff   Tobacco  Allergies  Meds  Problems  Med Hx  Surg Hx  Fam Hx  Soc   Hx          Reviewed and updated as needed this visit by Provider    Allergies  Meds  Problems              Social History     Tobacco Use     Smoking status: Never Smoker     Smokeless tobacco: Never Used   Substance Use Topics     Alcohol use: Yes     Alcohol/week: 0.0 standard drinks     Comment: moderate use in the past     If you drink alcohol do you typically have >3 drinks per day or >7 drinks per week? Yes      Alcohol Use 7/13/2022   Prescreen: >3 drinks/day or >7 drinks/week? No   Prescreen: >3 drinks/day or >7 drinks/week? -   No flowsheet data found.        Patient states his right knee has been bothering him for a while    Current providers sharing in care for this patient include:   Patient Care Team:  Clinic [facility] as PCP - General    The following health maintenance items are reviewed in Epic and correct as of today:  Health Maintenance Due   Topic Date Due     ANNUAL REVIEW OF  ORDERS  Never done     COLORECTAL CANCER SCREENING  Never done     ZOSTER IMMUNIZATION (1 of 2) Never done     AORTIC ANEURYSM SCREENING (SYSTEM ASSIGNED)  Never done     Pneumococcal Vaccine: 65+ Years (2 - PPSV23 or PCV20) 03/26/2019     Lab work is in process  Labs reviewed in EPIC  BP Readings from Last 3 Encounters:   07/20/22 130/70   03/26/18 136/68   01/08/18 (P) 122/70    Wt Readings from Last 3 Encounters:   07/20/22 86.2 kg (190 lb)   03/26/18 82.1 kg (181 lb)   01/08/18 80.7  kg (178 lb)                  Patient Active Problem List   Diagnosis     Hyperlipidemia LDL goal <160     Past Surgical History:   Procedure Laterality Date     BACK SURGERY  1995    Herniated disk     EYE SURGERY  2011    Lens implants, both eyes     ORTHOPEDIC SURGERY  2000    Meniscus - right knee       Social History     Tobacco Use     Smoking status: Never Smoker     Smokeless tobacco: Never Used   Substance Use Topics     Alcohol use: Yes     Alcohol/week: 0.0 standard drinks     Comment: moderate use in the past     Family History   Problem Relation Age of Onset     Diabetes Paternal Grandmother      Hypertension Father      Cerebrovascular Disease Father      Other Cancer Mother         Lung, smoker         Current Outpatient Medications   Medication Sig Dispense Refill     ascorbic acid (VITAMIN C) 250 MG CHEW chewable tablet Take 250 mg by mouth daily       fish oil-omega-3 fatty acids 1000 MG capsule Take 2 g by mouth daily       glucosamine-chondroitin 500-400 MG CAPS per capsule Take 1 capsule by mouth daily       multivitamin w/minerals (THERA-VIT-M) tablet Take 1 tablet by mouth daily       VITAMIN D, CHOLECALCIFEROL, PO Take 1,000 Units by mouth daily       No Known Allergies          Review of Systems   Constitutional: Negative for chills and fever.   HENT: Positive for hearing loss. Negative for congestion, ear pain and sore throat.    Eyes: Negative for pain and visual disturbance.   Respiratory: Positive for cough. Negative for shortness of breath.    Cardiovascular: Negative for chest pain, palpitations and peripheral edema.   Gastrointestinal: Negative for abdominal pain, constipation, diarrhea, heartburn, hematochezia and nausea.   Genitourinary: Negative for dysuria, frequency, genital sores, hematuria, impotence, penile discharge and urgency.   Musculoskeletal: Positive for arthralgias and myalgias. Negative for joint swelling.   Skin: Negative for rash.   Neurological: Positive for  "weakness. Negative for dizziness, headaches and paresthesias.   Psychiatric/Behavioral: Negative for mood changes. The patient is not nervous/anxious.      Constitutional, HEENT, cardiovascular, pulmonary, gi and gu systems are negative, except as otherwise noted.    OBJECTIVE:   /70 (BP Location: Left arm, Patient Position: Chair, Cuff Size: Adult Regular)   Pulse 57   Temp 98.6  F (37  C) (Temporal)   Ht 1.84 m (6' 0.44\")   Wt 86.2 kg (190 lb)   SpO2 98%   BMI 25.46 kg/m   Estimated body mass index is 25.46 kg/m  as calculated from the following:    Height as of this encounter: 1.84 m (6' 0.44\").    Weight as of this encounter: 86.2 kg (190 lb).  Physical Exam  GENERAL: healthy, alert and no distress  EYES: Eyes grossly normal to inspection, PERRL and conjunctivae and sclerae normal  HENT: ear canals and TM's normal, nose and mouth without ulcers or lesions  NECK: no adenopathy, no asymmetry, masses, or scars and thyroid normal to palpation  RESP: lungs clear to auscultation - no rales, rhonchi or wheezes  CV: regular rate and rhythm, normal S1 S2, no S3 or S4, no murmur, click or rub, no peripheral edema and peripheral pulses strong  ABDOMEN: soft, nontender, no hepatosplenomegaly, no masses and bowel sounds normal  MS: no gross musculoskeletal defects noted, no edema  SKIN: no suspicious lesions or rashes  NEURO: Normal strength and tone, mentation intact and speech normal  BACK: no CVA tenderness, no paralumbar tenderness  PSYCH: mentation appears normal, affect normal/bright  LYMPH: no cervical, supraclavicular, axillary, or inguinal adenopathy    Diagnostic Test Results:  Labs reviewed in Epic  Results for orders placed or performed in visit on 07/20/22   XR Knee Left 3 Views     Status: None    Narrative    XR KNEE LEFT 3 VIEWS   7/20/2022 12:29 PM     HISTORY: Chronic pain of left knee; Chronic pain of left knee  COMPARISON: None.       Impression    IMPRESSION: No acute fracture. Moderate " lateral and mild  patellofemoral compartment degenerative arthritis. Small knee joint  effusion. Collection of small calcified bodies projecting posterior to  the medial compartment could be intratesticular or within a cyst.    IMER DORMAN MD         SYSTEM ID:  [id number]   Results for orders placed or performed in visit on 07/20/22   Basic metabolic panel  (Ca, Cl, CO2, Creat, Gluc, K, Na, BUN)     Status: Abnormal   Result Value Ref Range    Sodium 141 133 - 144 mmol/L    Potassium 5.5 (H) 3.4 - 5.3 mmol/L    Chloride 109 94 - 109 mmol/L    Carbon Dioxide (CO2) 28 20 - 32 mmol/L    Anion Gap 4 3 - 14 mmol/L    Urea Nitrogen 19 7 - 30 mg/dL    Creatinine 1.01 0.66 - 1.25 mg/dL    Calcium 8.9 8.5 - 10.1 mg/dL    Glucose 107 (H) 70 - 99 mg/dL    GFR Estimate 79 >60 mL/min/1.73m2   Lipid panel reflex to direct LDL Fasting     Status: Abnormal   Result Value Ref Range    Cholesterol 252 (H) <200 mg/dL    Triglycerides 72 <150 mg/dL    Direct Measure HDL 62 >=40 mg/dL    LDL Cholesterol Calculated 176 (H) <=100 mg/dL    Non HDL Cholesterol 190 (H) <130 mg/dL    Patient Fasting > 8hrs? Yes     Narrative    Cholesterol  Desirable:  <200 mg/dL    Triglycerides  Normal:  Less than 150 mg/dL  Borderline High:  150-199 mg/dL  High:  200-499 mg/dL  Very High:  Greater than or equal to 500 mg/dL    Direct Measure HDL  Female:  Greater than or equal to 50 mg/dL   Male:  Greater than or equal to 40 mg/dL    LDL Cholesterol  Desirable:  <100mg/dL  Above Desirable:  100-129 mg/dL   Borderline High:  130-159 mg/dL   High:  160-189 mg/dL   Very High:  >= 190 mg/dL    Non HDL Cholesterol  Desirable:  130 mg/dL  Above Desirable:  130-159 mg/dL  Borderline High:  160-189 mg/dL  High:  190-219 mg/dL  Very High:  Greater than or equal to 220 mg/dL       ASSESSMENT / PLAN:   Jose was seen today for physical.    Diagnoses and all orders for this visit:    Need for immunization against tetanus alone  -     TD PRESERV FREE, IM (7+ YRS)  "(DECAVAC/TENIVAC)    Hyperlipidemia LDL goal <160  -     Basic metabolic panel  (Ca, Cl, CO2, Creat, Gluc, K, Na, BUN); Future  -     Lipid panel reflex to direct LDL Fasting; Future  -     Basic metabolic panel  (Ca, Cl, CO2, Creat, Gluc, K, Na, BUN)  -     Lipid panel reflex to direct LDL Fasting    Chronic pain of left knee  -     XR Knee Left 3 Views; Future    Colon cancer screening  -     EVA(EX[employer] SCIENCES)            COUNSELING:  Reviewed preventive health counseling, as reflected in patient instructions       Regular exercise       Healthy diet/nutrition       Vision screening       Hearing screening       Dental care       Bladder control       Fall risk prevention       Folic Acid       Colon cancer screening    Estimated body mass index is 25.46 kg/m  as calculated from the following:    Height as of this encounter: 1.84 m (6' 0.44\").    Weight as of this encounter: 86.2 kg (190 lb).    Weight management plan: Discussed healthy diet and exercise guidelines    He reports that he has never smoked. He has never used smokeless tobacco.      Appropriate preventive services were discussed with this patient, including applicable screening as appropriate for cardiovascular disease, diabetes, osteopenia/osteoporosis, and glaucoma.  As appropriate for age/gender, discussed screening for colorectal cancer, prostate cancer, breast cancer, and cervical cancer. Checklist reviewing preventive services available has been given to the patient.    Reviewed patients plan of care and provided an AVS. The Basic Care Plan (routine screening as documented in Health Maintenance) for Jose meets the Care Plan requirement. This Care Plan has been established and reviewed with the Patient.    Counseling Resources:  ATP IV Guidelines  Pooled Cohorts Equation Calculator  Breast Cancer Risk Calculator  Breast Cancer: Medication to Reduce Risk  FRAX Risk Assessment  ICSI Preventive Guidelines  Dietary Guidelines for Americans, " 2010  USDA's MyPlate  ASA Prophylaxis  Lung CA Screening    Damion Garcia MD  [facility]    Identified Health Risks:

## 2022-08-08 LAB — NONINV COLON CA DNA+OCC BLD SCRN STL QL: NEGATIVE

## 2022-08-08 NOTE — RESULT ENCOUNTER NOTE
Thank you for completing your Cologuard colon cancer screening test. Your test was NEGATIVE, which means you are at low risk for developing colon cancer in the next 3 years. We will repeat this test in 3 years. Please remember if you have any blood in the stool, changes in your bowel habits or other abdominal symptoms you should be seen in clinic and your risks for colon cancer re-evaluated.   Please call the clinic with any questions.

## 2022-10-29 ENCOUNTER — HEALTH MAINTENANCE LETTER (OUTPATIENT)
Age: 72
End: 2022-10-29

## 2023-05-04 ENCOUNTER — ANCILLARY PROCEDURE (OUTPATIENT)
Dept: GENERAL RADIOLOGY | Facility: CLINIC | Age: 73
End: 2023-05-04
Attending: FAMILY MEDICINE
Payer: COMMERCIAL

## 2023-05-04 ENCOUNTER — OFFICE VISIT (OUTPATIENT)
Dept: FAMILY MEDICINE | Facility: CLINIC | Age: 73
End: 2023-05-04
Payer: COMMERCIAL

## 2023-05-04 VITALS
HEIGHT: 70 IN | RESPIRATION RATE: 18 BRPM | DIASTOLIC BLOOD PRESSURE: 72 MMHG | OXYGEN SATURATION: 96 % | WEIGHT: 200 LBS | HEART RATE: 56 BPM | BODY MASS INDEX: 28.63 KG/M2 | SYSTOLIC BLOOD PRESSURE: 125 MMHG | TEMPERATURE: 97.7 F

## 2023-05-04 DIAGNOSIS — M79.672 LEFT FOOT PAIN: ICD-10-CM

## 2023-05-04 DIAGNOSIS — M54.17 LUMBOSACRAL RADICULOPATHY: Primary | ICD-10-CM

## 2023-05-04 DIAGNOSIS — M25.562 CHRONIC PAIN OF LEFT KNEE: ICD-10-CM

## 2023-05-04 DIAGNOSIS — G89.29 CHRONIC PAIN OF LEFT KNEE: ICD-10-CM

## 2023-05-04 DIAGNOSIS — M54.17 LUMBOSACRAL RADICULOPATHY: ICD-10-CM

## 2023-05-04 PROCEDURE — 72220 X-RAY EXAM SACRUM TAILBONE: CPT | Mod: TC | Performed by: RADIOLOGY

## 2023-05-04 PROCEDURE — 72100 X-RAY EXAM L-S SPINE 2/3 VWS: CPT | Mod: TC | Performed by: RADIOLOGY

## 2023-05-04 PROCEDURE — 99214 OFFICE O/P EST MOD 30 MIN: CPT | Performed by: FAMILY MEDICINE
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
## 2023-05-08 ENCOUNTER — THERAPY VISIT (OUTPATIENT)
Dept: PHYSICAL THERAPY | Facility: CLINIC | Age: 73
End: 2023-05-08
Attending: FAMILY MEDICINE
Payer: COMMERCIAL

## 2023-05-08 DIAGNOSIS — G89.29 CHRONIC PAIN OF LEFT KNEE: ICD-10-CM

## 2023-05-08 DIAGNOSIS — M25.562 CHRONIC PAIN OF LEFT KNEE: ICD-10-CM

## 2023-05-08 PROCEDURE — 97110 THERAPEUTIC EXERCISES: CPT | Mod: GP | Performed by: PHYSICAL THERAPIST

## 2023-05-08 PROCEDURE — 97161 PT EVAL LOW COMPLEX 20 MIN: CPT | Mod: GP | Performed by: PHYSICAL THERAPIST

## 2023-05-08 ASSESSMENT — ACTIVITIES OF DAILY LIVING (ADL)
LIMPING: THE SYMPTOM AFFECTS MY ACTIVITY MODERATELY
WALK: ACTIVITY IS SOMEWHAT DIFFICULT
SQUAT: ACTIVITY IS VERY DIFFICULT
KNEE_ACTIVITY_OF_DAILY_LIVING_SCORE: 47.14
GIVING WAY, BUCKLING OR SHIFTING OF KNEE: THE SYMPTOM AFFECTS MY ACTIVITY SLIGHTLY
STAND: ACTIVITY IS MINIMALLY DIFFICULT
KNEEL ON THE FRONT OF YOUR KNEE: ACTIVITY IS VERY DIFFICULT
STIFFNESS: THE SYMPTOM AFFECTS MY ACTIVITY MODERATELY
SWELLING: I DO NOT HAVE THE SYMPTOM
WEAKNESS: THE SYMPTOM AFFECTS MY ACTIVITY MODERATELY
RAW_SCORE: 33
HOW_WOULD_YOU_RATE_THE_OVERALL_FUNCTION_OF_YOUR_KNEE_DURING_YOUR_USUAL_DAILY_ACTIVITIES?: ABNORMAL
KNEE_ACTIVITY_OF_DAILY_LIVING_SUM: 33
SIT WITH YOUR KNEE BENT: ACTIVITY IS SOMEWHAT DIFFICULT
HOW_WOULD_YOU_RATE_THE_CURRENT_FUNCTION_OF_YOUR_KNEE_DURING_YOUR_USUAL_DAILY_ACTIVITIES_ON_A_SCALE_FROM_0_TO_100_WITH_100_BEING_YOUR_LEVEL_OF_KNEE_FUNCTION_PRIOR_TO_YOUR_INJURY_AND_0_BEING_THE_INABILITY_TO_PERFORM_ANY_OF_YOUR_USUAL_DAILY_ACTIVITIES?: 45
AS_A_RESULT_OF_YOUR_KNEE_INJURY,_HOW_WOULD_YOU_RATE_YOUR_CURRENT_LEVEL_OF_DAILY_ACTIVITY?: ABNORMAL
GO DOWN STAIRS: ACTIVITY IS FAIRLY DIFFICULT
PAIN: THE SYMPTOM AFFECTS MY ACTIVITY MODERATELY
RISE FROM A CHAIR: ACTIVITY IS VERY DIFFICULT
GO UP STAIRS: ACTIVITY IS FAIRLY DIFFICULT

## 2023-05-08 NOTE — PROGRESS NOTES
[facility]    OUTPATIENT Physical Therapy ORTHOPEDIC EVALUATION  PLAN OF TREATMENT FOR OUTPATIENT REHABILITATION  (COMPLETE FOR INITIAL CLAIMS ONLY)  Patient's Last Name, First Name, M.I.  YOB: 1950  Jose Morocho    Provider s Name:  [facility]   Medical Record No.  [id number]   Start of Care Date:  05/08/23   Onset Date:   05/04/23 (MD referral)   Treatment Diagnosis:  L knee pain Medical Diagnosis:  Chronic pain of left knee       Goals:     05/08/23 0500   Body Part   Goals listed below are for L knee   Goal #1   Goal #1 self cares/transfers/bed mobility   Previous Functional Level No restrictions   Current Functional Level Able ;to transfer in and out of a car;to transfer in and out of a bed   Performance Level 5/10 pain   STG Target Performance Be able to ; transfer in and out of a car; transfer in and out of a chair; transfer in and out of a bed   Performance Level 2/10 pain   Rationale for independent self care such as dressing, personal hygiene, bathing;for independent community transportation;for independent living   Due Date 06/19/23   LTG Target Performance Be able to ; transfer in and out of a car; transfer in and out of a chair; transfer in and out of a bed   Performance level 1/10 pain   Rationale independence in self cares;for independent community transportation;for independent living   Due Date 07/31/23         Therapy Frequency:  2x/ month  Predicted Duration of Therapy Intervention:  3 month    Casey Adkins, PT                 I CERTIFY THE NEED FOR THESE SERVICES FURNISHED UNDER        THIS PLAN OF TREATMENT AND WHILE UNDER MY CARE     (Physician attestation of this document indicates review and certification of the therapy plan).                     Certification Date From:  05/08/23   Certification Date To:  08/05/23    Referring  Provider:  Norris Kan    Initial Assessment        See Epic Evaluation SOC Date: 05/08/23

## 2023-05-08 NOTE — PROGRESS NOTES
Physical Therapy Initial Evaluation  Subjective:  The history is provided by the patient.   Patient Health History  Jose Morocho being seen for Left Knee evaluation.     Problem began: 5/5/2023.   Problem occurred: Long time problem. Knee gets sore with overuse.   Pain is reported as 3/10 on pain scale.  General health as reported by patient is good.  Pertinent medical history includes: history of fractures and numbness/tingling.     Medical allergies: none.   Surgeries include:  Orthopedic surgery. Other surgery history details: Back surgery (disc) mid 1990s.    Current medications:  None.       Primary job tasks include:  Computer work, driving, lifting/carrying and prolonged sitting.                  Therapist Generated HPI Evaluation  Problem details: Sx for years initially due to a fall.  3 months ago pain increased after biking for about 80 min.  MD referral 5/4/2023..         Type of problem:  Left knee.    This is a recurrent condition.  Condition occurred with:  Insidious onset and repetition/overuse.  Where condition occurred: during recreation/sport (most recent episode).  Patient reports pain:  Lateral and medial.  Pain is described as aching and is intermittent.  Pain radiates to:  Thigh. Pain is the same all the time.  Progression since onset: comes and goes.  Associated symptoms:  Buckling/giving out and loss of strength (clicking). Symptoms are exacerbated by descending stairs, ascending stairs and bending/squatting (prolong sit to stand)  and relieved by rest.  Imaging testing: none recent - last year showed degen with small effusion                Knee Activity of Daily Living Score: 47.14            Objective:  Standing Alignment:              Knee deviations alignment: stands with slightly flexed L knee.  Swelling present at knee and atrophy noted in quad.      Gait:    Gait Type:  Antalgic     Deviations:  Knee:  Knee extension decr L                                                      Knee  Evaluation:  ROM:    AROM      Extension:  Left: 7    Right:  0  Flexion: Left: 119    Right: 131        Strength:     Extension:  Left: 4+/5   Pain:      Right: 5/5   Pain:  Flexion:  Left: 5-/5   Pain:      Right: 5/5   Pain:    Quad Set Left: Fair    Pain:   Quad Set Right: Good    Pain:  Ligament Testing:  Normal                Special Tests:   Left knee positive for the following special tests:  Patellar Compression  Left knee negative for the following special tests:  Meninscal    Right knee negative for the following special tests:  Meniscal and Patellar Compression  Palpation:      Left knee tenderness not present at:  Medial Joint Line; Lateral Joint Line and Patellar Tendon    Right knee tenderness not present at:  Medial Joint Line; Lateral Joint Line and Patellar Tendon  Edema:    Circumference:      Joint Line:  Left:  42.5   Right:  39.5    Mobility Testing:  Normal            Functional Testing:          Quad:    Single Leg Squat:  Left:     Moderate loss of control  Right:        Bilateral Leg Squat:                General     ROS    Assessment/Plan:    Patient is a 72 year old male with left side knee complaints.    Patient has the following significant findings with corresponding treatment plan.                Diagnosis 1:  L knee pain  Pain -  self management, education, directional preference exercise and home program  Decreased ROM/flexibility - manual therapy and therapeutic exercise  Decreased strength - therapeutic exercise and therapeutic activities  Impaired gait - gait training  Impaired muscle performance - neuro re-education  Decreased function - therapeutic activities    Therapy Evaluation Codes:   1) History comprised of:   Personal factors that impact the plan of care:      Time since onset of symptoms.    Comorbidity factors that impact the plan of care are:      Numbness/tingling.     Medications impacting care: None.  2) Examination of Body Systems comprised of:   Body structures  and functions that impact the plan of care:      Knee.   Activity limitations that impact the plan of care are:      Squatting/kneeling, Stairs, Standing and Walking.  3) Clinical presentation characteristics are:   Stable/Uncomplicated.  4) Decision-Making    Low complexity using standardized patient assessment instrument and/or measureable assessment of functional outcome.  Cumulative Therapy Evaluation is: Low complexity.    Previous and current functional limitations:  (See Goal Flow Sheet for this information)    Short term and Long term goals: (See Goal Flow Sheet for this information)     Communication ability:  Patient appears to be able to clearly communicate and understand verbal and written communication and follow directions correctly.  Treatment Explanation - The following has been discussed with the patient:   RX ordered/plan of care  Anticipated outcomes  Possible risks and side effects  This patient would benefit from PT intervention to resume normal activities.   Rehab potential is good.    Frequency:  2 X a month, once daily  Duration:  for 3 months  Discharge Plan:  Achieve all LTG.  Independent in home treatment program.  Reach maximal therapeutic benefit.    Please refer to the daily flowsheet for treatment today, total treatment time and time spent performing 1:1 timed codes.

## 2023-05-10 ENCOUNTER — ANCILLARY PROCEDURE (OUTPATIENT)
Dept: GENERAL RADIOLOGY | Facility: CLINIC | Age: 73
End: 2023-05-10
Attending: PODIATRIST
Payer: COMMERCIAL

## 2023-05-10 ENCOUNTER — OFFICE VISIT (OUTPATIENT)
Dept: PODIATRY | Facility: CLINIC | Age: 73
End: 2023-05-10
Attending: FAMILY MEDICINE
Payer: COMMERCIAL

## 2023-05-10 VITALS
WEIGHT: 200 LBS | HEART RATE: 58 BPM | DIASTOLIC BLOOD PRESSURE: 84 MMHG | BODY MASS INDEX: 28.7 KG/M2 | SYSTOLIC BLOOD PRESSURE: 157 MMHG

## 2023-05-10 DIAGNOSIS — M21.062 GENU VALGUM, ACQUIRED, LEFT: ICD-10-CM

## 2023-05-10 DIAGNOSIS — M19.072 ARTHRITIS OF LEFT SUBTALAR JOINT: Primary | ICD-10-CM

## 2023-05-10 DIAGNOSIS — M25.572 LEFT ANKLE PAIN: ICD-10-CM

## 2023-05-10 PROCEDURE — 99204 OFFICE O/P NEW MOD 45 MIN: CPT | Performed by: PODIATRIST

## 2023-05-10 PROCEDURE — 73610 X-RAY EXAM OF ANKLE: CPT | Mod: TC | Performed by: RADIOLOGY

## 2023-05-10 NOTE — LETTER
5/10/2023         RE: Jose Morocho  [address] [facility] [place] [address]        Dear Colleague,    Thank you for referring your patient, Jose Morocho, to the [facility]. Please see a copy of my visit note below.      Assessment:      ICD-10-CM    1. Arthritis of left subtalar joint  M19.072 XR Ankle Left G/E 3 Views     [facility] Other (Comments)     Orthopedic  Referral      2. Genu valgum, acquired, left  M21.062 [facility] Other (Comments)     Orthopedic  Referral             Plan:  Orders Placed This Encounter   Procedures     XR Ankle Left G/E 3 Views     Orthopedic  Referral     [facility] Other (Comments)       Discussed the etiology and treatment of the condition with the patient.  Imaging studies reviewed and discussed with the patient.  Discussed surgical and conservative options.    Post-traumatic valgus ankle and STJ arthritis   Valgus tib/fib diaphyseal malunion w/ valgus knee - also painful    -Injection- to STJ dicsused  -NSAID- topical or Po discussed    -Brace- custom rx    Discussed I recommend he consult for L knee realignment prior to any rf/ankle surgery    I can perform STJ arthrodesis after if needed    -Ortho referral today - L knee realignement      Return:  No follow-ups on file.                Chief Complaint:     Patient presents with:  Left Ankle - Pain     left ankle pain    HPI:  Jose Morocho is a 72 year old year old male who presents for evaluation of ankle pain.    Pain location- anterior, lateral ankle   Pt sustained a closed tib and fib Fx as a teen.  States was in a cast but no surgery.    States currently his L knee and ankle hurt.  He is undergoing PT for his knee.  States outside of ankle is the most painful    Used to run - hasn't for years.  Likes to walk for exercise.      Past Medical & Surgical History:  Past Medical History:   Diagnosis Date      Arthritis     Minor, knees      Past Surgical History:   Procedure Laterality Date     BACK SURGERY  1995    Herniated disk     EYE SURGERY  2011    Lens implants, both eyes     ORTHOPEDIC SURGERY  2000    Meniscus - right knee      Family History   Problem Relation Age of Onset     Diabetes Paternal Grandmother      Hypertension Father      Cerebrovascular Disease Father      Other Cancer Mother         Lung, smoker        Social History:  ?  History   Smoking Status     Never   Smokeless Tobacco     Never     History   Drug Use No     Social History    Substance and Sexual Activity      Alcohol use: Yes        Alcohol/week: 0.0 standard drinks of alcohol        Comment: moderate use in the past      Allergies:  ?   No Known Allergies     Medications:    Current Outpatient Medications   Medication     ascorbic acid (VITAMIN C) 250 MG CHEW chewable tablet     fish oil-omega-3 fatty acids 1000 MG capsule     glucosamine-chondroitin 500-400 MG CAPS per capsule     multivitamin w/minerals (THERA-VIT-M) tablet     VITAMIN D, CHOLECALCIFEROL, PO     No current facility-administered medications for this visit.         Physical Exam:  ?  Vitals:  BP (!) 157/84   Pulse 58   Wt 90.7 kg (200 lb)   BMI 28.70 kg/m     General:  WD/WN, in NAD.  A&O x3.  Dermatologic:    Skin is intact, open lesions absent.   Skin texture, turgor is normal.  Vascular:  Pulses palpable bilateral.  Digital capillary refill time normal bilateral.  Skin temperature is normal bilateral.  Generalized edema- none bilateral.  Focal edema- moderate lateral RF/ ankle left.  Neurologic:    Gross sensation normal.  Gait and balance abnormal, antalgic, L.  Musculoskeletal:  Maximal pain to palpation of sinus tarsi, sibfibular left.  moderate pain to palpation of lateral ankle joint, left.  Ankle ROM limited, mildly painful left.  STJ ROM significantly limited L vs R    Muscle strength 5/5  foot & ankle bilateral.    Stance:  Genu valgus L, neutral R  RCSP  valgus b/l but R > L.  Foot type:  Partially compensated genu valgus  Clinical deformity: valgus L knee    Imaging:   x-ray independently reviewed and interpreted by myself today.  Weight-bearing views left ankle dated 05/10/23, reveal significant STJ arthritis, mainly laterally with large subfibular spurring.  Tibial diaphyseal malunion - with lateral translation.  Early valgus ankle arthritis - anterior ankle spurring on lateral view, large lateral plafond spur visible on AP ankle.  Significant lateral gutter narrowing & spurring  Congruent ankle valgus - talar tilt 3 deg        Again, thank you for allowing me to participate in the care of your patient.        Sincerely,        Angeli Henning DPM

## 2023-05-10 NOTE — PATIENT INSTRUCTIONS
PATIENT INSTRUCTIONS - Podiatry / Foot & Ankle Surgery    Valgus knee from prior fracture  Valgus ankle arthritis and more signfiicant subtalar joint arthritis    Cortisone injection to STJ if needed    Custom brace:    [facility] [facility]  [facility]  [address]  [place] [address]  Phone: [phone]  Fax: [phone] [facility]  [address]  [place] [address]  Phone: [phone]   Fax: [phone]   [facility]  [address]  [place] [address]  Phone: [phone]  Fax: [phone] [facility]  [address]  [place] [address]  Phone: [phone]   Fax: [phone]   [facility]   [address]  [place] [address]  Phone: [phone]  Fax: [phone] * Please call any location listed to make an appointment for a casting/fitting. Your referral was sent to their central office and they will all have the order on file.         Consult for L knee realignment prior to L ankle / STJ - call for ortho referral

## 2023-05-10 NOTE — PROGRESS NOTES
Assessment:      ICD-10-CM    1. Arthritis of left subtalar joint  M19.072 XR Ankle Left G/E 3 Views     [facility] Other (Comments)     Orthopedic  Referral      2. Genu valgum, acquired, left  M21.062 [facility] Other (Comments)     Orthopedic  Referral             Plan:  Orders Placed This Encounter   Procedures     XR Ankle Left G/E 3 Views     Orthopedic  Referral     [facility] Other (Comments)       Discussed the etiology and treatment of the condition with the patient.  Imaging studies reviewed and discussed with the patient.  Discussed surgical and conservative options.    Post-traumatic valgus ankle and STJ arthritis   Valgus tib/fib diaphyseal malunion w/ valgus knee - also painful    -Injection- to STJ dicsused  -NSAID- topical or Po discussed    -Brace- custom rx    Discussed I recommend he consult for L knee realignment prior to any rf/ankle surgery    I can perform STJ arthrodesis after if needed    -Ortho referral today - L knee realignement      Return:  No follow-ups on file.                Chief Complaint:     Patient presents with:  Left Ankle - Pain     left ankle pain    HPI:  Jose Morocho is a 72 year old year old male who presents for evaluation of ankle pain.    Pain location- anterior, lateral ankle   Pt sustained a closed tib and fib Fx as a teen.  States was in a cast but no surgery.    States currently his L knee and ankle hurt.  He is undergoing PT for his knee.  States outside of ankle is the most painful    Used to run - hasn't for years.  Likes to walk for exercise.      Past Medical & Surgical History:  Past Medical History:   Diagnosis Date     Arthritis     Minor, knees      Past Surgical History:   Procedure Laterality Date     BACK SURGERY  1995    Herniated disk     EYE SURGERY  2011    Lens implants, both eyes     ORTHOPEDIC SURGERY  2000    Meniscus - right knee      Family History    Problem Relation Age of Onset     Diabetes Paternal Grandmother      Hypertension Father      Cerebrovascular Disease Father      Other Cancer Mother         Lung, smoker        Social History:  ?  History   Smoking Status     Never   Smokeless Tobacco     Never     History   Drug Use No     Social History    Substance and Sexual Activity      Alcohol use: Yes        Alcohol/week: 0.0 standard drinks of alcohol        Comment: moderate use in the past      Allergies:  ?   No Known Allergies     Medications:    Current Outpatient Medications   Medication     ascorbic acid (VITAMIN C) 250 MG CHEW chewable tablet     fish oil-omega-3 fatty acids 1000 MG capsule     glucosamine-chondroitin 500-400 MG CAPS per capsule     multivitamin w/minerals (THERA-VIT-M) tablet     VITAMIN D, CHOLECALCIFEROL, PO     No current facility-administered medications for this visit.         Physical Exam:  ?  Vitals:  BP (!) 157/84   Pulse 58   Wt 90.7 kg (200 lb)   BMI 28.70 kg/m     General:  WD/WN, in NAD.  A&O x3.  Dermatologic:    Skin is intact, open lesions absent.   Skin texture, turgor is normal.  Vascular:  Pulses palpable bilateral.  Digital capillary refill time normal bilateral.  Skin temperature is normal bilateral.  Generalized edema- none bilateral.  Focal edema- moderate lateral RF/ ankle left.  Neurologic:    Gross sensation normal.  Gait and balance abnormal, antalgic, L.  Musculoskeletal:  Maximal pain to palpation of sinus tarsi, sibfibular left.  moderate pain to palpation of lateral ankle joint, left.  Ankle ROM limited, mildly painful left.  STJ ROM significantly limited L vs R    Muscle strength 5/5  foot & ankle bilateral.    Stance:  Genu valgus L, neutral R  RCSP valgus b/l but R > L.  Foot type:  Partially compensated genu valgus  Clinical deformity: valgus L knee    Imaging:   x-ray independently reviewed and interpreted by myself today.  Weight-bearing views left ankle dated 05/10/23, reveal significant  STJ arthritis, mainly laterally with large subfibular spurring.  Tibial diaphyseal malunion - with lateral translation.  Early valgus ankle arthritis - anterior ankle spurring on lateral view, large lateral plafond spur visible on AP ankle.  Significant lateral gutter narrowing & spurring  Congruent ankle valgus - talar tilt 3 deg

## 2023-05-31 ENCOUNTER — THERAPY VISIT (OUTPATIENT)
Dept: PHYSICAL THERAPY | Facility: CLINIC | Age: 73
End: 2023-05-31
Payer: COMMERCIAL

## 2023-05-31 DIAGNOSIS — G89.29 CHRONIC PAIN OF LEFT KNEE: Primary | ICD-10-CM

## 2023-05-31 DIAGNOSIS — M25.562 CHRONIC PAIN OF LEFT KNEE: Primary | ICD-10-CM

## 2023-05-31 PROCEDURE — 97110 THERAPEUTIC EXERCISES: CPT | Mod: GP | Performed by: PHYSICAL THERAPIST

## 2023-06-27 ENCOUNTER — THERAPY VISIT (OUTPATIENT)
Dept: PHYSICAL THERAPY | Facility: CLINIC | Age: 73
End: 2023-06-27
Payer: COMMERCIAL

## 2023-06-27 DIAGNOSIS — G89.29 CHRONIC PAIN OF LEFT KNEE: Primary | ICD-10-CM

## 2023-06-27 DIAGNOSIS — M25.562 CHRONIC PAIN OF LEFT KNEE: Primary | ICD-10-CM

## 2023-06-27 PROCEDURE — 97110 THERAPEUTIC EXERCISES: CPT | Mod: GP | Performed by: PHYSICAL THERAPIST

## 2023-07-25 ENCOUNTER — THERAPY VISIT (OUTPATIENT)
Dept: PHYSICAL THERAPY | Facility: CLINIC | Age: 73
End: 2023-07-25
Payer: COMMERCIAL

## 2023-07-25 DIAGNOSIS — G89.29 CHRONIC PAIN OF LEFT KNEE: Primary | ICD-10-CM

## 2023-07-25 DIAGNOSIS — M25.562 CHRONIC PAIN OF LEFT KNEE: Primary | ICD-10-CM

## 2023-07-25 PROCEDURE — 97110 THERAPEUTIC EXERCISES: CPT | Mod: GP | Performed by: PHYSICAL THERAPIST

## 2023-09-02 ENCOUNTER — HEALTH MAINTENANCE LETTER (OUTPATIENT)
Age: 73
End: 2023-09-02

## 2023-10-10 PROBLEM — M25.562 CHRONIC PAIN OF LEFT KNEE: Status: RESOLVED | Noted: 2023-05-08 | Resolved: 2023-10-10

## 2023-10-10 PROBLEM — G89.29 CHRONIC PAIN OF LEFT KNEE: Status: RESOLVED | Noted: 2023-05-08 | Resolved: 2023-10-10

## 2023-10-10 NOTE — PROGRESS NOTES
Discharge Note    Progress reporting period is from initial evaluation date (please see noted date below) to Jul 25, 2023.  Linked Episodes   Type: Episode: Status: Noted: Resolved: Last update: Updated by:   PHYSICAL THERAPY L knee pain 5/8/2023 Active 5/8/2023 7/25/2023  1:54 PM Casey Adkins, PT      Comments:       Jose failed to follow up and current status is unknown.  Please see information below for last relevant information on current status.  Patient seen for   visits.    SUBJECTIVE  Subjective changes noted by patient:     .  Current pain level is  .     Previous pain level was   .   Changes in function:  Yes (See Goal flowsheet attached for changes in current functional level)  Adverse reaction to treatment or activity: None    OBJECTIVE  Changes noted in objective findings:       ASSESSMENT/PLAN      Updated problem list and treatment plan:   Pain - HEP  Decreased ROM/flexibility - HEP  Decreased function - HEP  Decreased strength - HEP  Impaired muscle performance - HEP  STG/LTGs have been met or progress has been made towards goals:  Yes, please see goal flowsheet for most current information  Assessment of Progress: current status is unknown.    Last current status:     Self Management Plans:  HEP  I have re-evaluated this patient and find that the nature, scope, duration and intensity of the therapy is appropriate for the medical condition of the patient.  Jose continues to require the following intervention to meet STG and LTG's:  HEP.    Recommendations:  Discharge with current home program.  Patient to follow up with MD as needed.    Please refer to the daily flowsheet for treatment today, total treatment time and time spent performing 1:1 timed codes.

## 2024-03-15 ENCOUNTER — TELEPHONE (OUTPATIENT)
Dept: FAMILY MEDICINE | Facility: CLINIC | Age: 74
End: 2024-03-15
Payer: COMMERCIAL

## 2024-03-15 NOTE — TELEPHONE ENCOUNTER
Patient Quality Outreach    Patient is due for the following:   Physical Annual Wellness Visit      Topic Date Due    Zoster (Shingles) Vaccine (1 of 2) Never done    Pneumococcal Vaccine (2 of 2 - PPSV23 or PCV20) 03/26/2019    Flu Vaccine (1) 09/01/2023    COVID-19 Vaccine (6 - 2023-24 season) 09/01/2023       Next Steps:   Schedule a Annual Wellness Visit    Type of outreach:    Sent [employer] message.      Questions for provider review:    None           Najma Montano CMA  Chart routed to Care Team.

## 2024-06-19 ENCOUNTER — OFFICE VISIT (OUTPATIENT)
Dept: FAMILY MEDICINE | Facility: CLINIC | Age: 74
End: 2024-06-19
Payer: COMMERCIAL

## 2024-06-19 VITALS
WEIGHT: 194 LBS | TEMPERATURE: 99.2 F | OXYGEN SATURATION: 96 % | HEIGHT: 70 IN | BODY MASS INDEX: 27.77 KG/M2 | SYSTOLIC BLOOD PRESSURE: 136 MMHG | DIASTOLIC BLOOD PRESSURE: 78 MMHG | HEART RATE: 60 BPM | RESPIRATION RATE: 18 BRPM

## 2024-06-19 DIAGNOSIS — M54.50 ACUTE RIGHT-SIDED LOW BACK PAIN WITHOUT SCIATICA: Primary | ICD-10-CM

## 2024-06-19 PROCEDURE — 99213 OFFICE O/P EST LOW 20 MIN: CPT | Performed by: FAMILY MEDICINE

## 2024-06-19 PROCEDURE — G2211 COMPLEX E/M VISIT ADD ON: HCPCS | Performed by: FAMILY MEDICINE

## 2024-06-19 RX ORDER — RESPIRATORY SYNCYTIAL VIRUS VACCINE 120MCG/0.5
0.5 KIT INTRAMUSCULAR ONCE
Qty: 1 EACH | Refills: 0 | Status: CANCELLED | OUTPATIENT
Start: 2024-06-19 | End: 2024-06-19

## 2024-06-19 RX ORDER — CYCLOBENZAPRINE HCL 10 MG
10 TABLET ORAL 3 TIMES DAILY PRN
Qty: 25 TABLET | Refills: 0 | Status: SHIPPED | OUTPATIENT
Start: 2024-06-19

## 2024-06-19 ASSESSMENT — PAIN SCALES - GENERAL: PAINLEVEL: SEVERE PAIN (7)

## 2024-06-19 NOTE — PATIENT INSTRUCTIONS
When You Have Low Back Pain    Caring for Your Back  You are not alone.    Low back pain is very common. Nearly half of all adults have low back pain in any given year. The good news is that back pain is rarely a danger to your health. Most people can manage their back pain on their own and about half of them start feeling better within 2 weeks. In 9 out of 10 cases, low back pain goes away or no longer limits daily activity within 6 weeks.     Your outlook is good!    Your symptoms tell us that your low back pain is most likely not a danger to you. Most of the time we do not know the exact cause of low back pain, even if you see a doctor or have an MRI. However, treatment can still work without knowing the cause of the pain. Less than 1 in 100 people need surgery for their back pain.     What can I do about my low back pain?     There are three things you can do to ease low back pain and help it go away.   Use heat or cold packs.   Take medicine as directed.   Use positions, movements and exercises.     Using heat or cold packs    Try cold packs or gentle heat to ease your pain. Use whichever gives you the most relief. Apply the cold pack or heat for 15 minutes at a time, as often as needed.    Taking medicine     If your doctor has prescribed medicine, be sure to follow the directions.   If you take over-the-counter medicine, read and follow the directions.   Talk to your doctor if you have any questions.     Using positions, movements and exercises    Research tells us that moving your joints and muscles can help you recover from back pain. Such activity should be simple and gentle. Use the positions in the photos as well as walking to help relieve your pain. Try taking a short walk every 3 to 4 hours during the day. Walk for a few minutes inside your home or take longer walks outside, on a treadmill or at a mall. Slowly increase the amount of time you walk. Expect discomfort when you begin, but it should lessen  as your back starts to heal. When your back feels better, walk daily to keep your back and body healthy.    Finding a comfortable position    When your back pain is new, certain positions will ease your pain. Gently try each of the positions below until you find one that is helpful. Once you find a position of comfort, use it as often as you like when you are resting. You will recover faster if you combine rest with activity.         Lie on your back with your legs bent. You can do this by placing a pillow under your knees. Or you may lie on the floor and rest your lower legs on the seat of a chair.       Lie on your side with your knees bent, and place a pillow between your knees.       Lie on your stomach over pillows.      When should I call my doctor?    Your back pain should improve over the first couple of weeks. As it improves, you should be able to return to your normal activities. But call your doctor if:   You have a sudden change in your ability to control your bladder or bowels.   You feel tingling in your groin or legs.   The pain spreads down your leg and into your foot.   Your toes, feet or leg muscles feel weak.   You feel generally unwell or sick.   Your pain does not get better or gets worse.      If you are deaf or hard of hearing, please let us know. We provide many free services including sign language interpreters,oral interpreters, TTYs, telephone amplifiers, note takers and written materials.    For informational purposes only. Not to replace the advice of your health care provider. Copyright   2013 [employer] [facility]. All rights reserved. [employer] 278696 - Rev 06/14.

## 2024-06-19 NOTE — PROGRESS NOTES
Assessment & Plan     Acute right-sided low back pain without sciatica    - Physical Therapy  Referral; Future  - cyclobenzaprine (FLEXERIL) 10 MG tablet; Take 1 tablet (10 mg) by mouth 3 times daily as needed for muscle spasms  - UA Macroscopic with reflex to Microscopic and Culture - Lab Collect; Future      Advised PT  Discussed muscle relaxants can make you Drowsy -so not to take it while Driving or working.  Tylenol otc        Subjective   Clemente is a 73 year old, presenting for the following health issues:  Back Pain        6/19/2024     4:21 PM   Additional Questions   Roomed by Sherley     History of Present Illness       Back Pain:  He presents for follow up of back pain. Patient's back pain is a new problem.    Original cause of back pain: lifting  First noticed back pain: 1-4 weeks ago  Patient feels back pain: constantlyLocation of back pain:  Right lower back and right hip  Description of back pain: gnawing  Back pain spreads: right buttocks    Since patient first noticed back pain, pain is: always present, but gets better and worse  Does back pain interfere with his job:  Not applicable  On a scale of 1-10 (10 being the worst), patient describes pain as:  5  What makes back pain worse: certain positions, lying down, standing and twisting   Acupuncture: not tried  Acetaminophen: not tried  Activity or exercise: not helpful  Chiropractor:  Not tried  Cold: not tried  Heat: not helpful  Massage: not tried  Muscle relaxants: not tried  NSAIDS: not helpful  Opioids: not tried  Physical Therapy: not tried  Rest: not helpful  Steroid Injection: not tried  Stretching: not helpful  Surgery: not tried  TENS unit: not tried  Topical pain relievers: not tried  Other healthcare providers patient is seeing for back pain: None    He eats 2-3 servings of fruits and vegetables daily.He consumes 0 sweetened beverage(s) daily.He exercises with enough effort to increase his heart rate 30 to 60 minutes per day.  He  "exercises with enough effort to increase his heart rate 5 days per week.   He is taking medications regularly.       Pain History:  When did you first notice your pain?  2weeks ago     Where in your body do you have pain? Back Pain  Onset/Duration: 2 weeks ago   Description:   Location of pain: low back right  Character of pain: dull  Pain radiation: none  New numbness or weakness in legs, not attributed to pain: no   Intensity: Currently 2/10, mild, moderate  Progression of Symptoms: same  History:   Specific cause: was Exercising  Pain interferes with job:  no   History of back problems: had Previous Surgery 20 years ago  Any previous MRI or X-rays: as above   Sees a specialist for back pain: No  Alleviating factors:   Improved by: NSAIDs    Precipitating factors:  Worsened by: Lying Flat  Standing worse   Sitting -best  Therapies tried and outcome: NSAIDs    Accompanying Signs & Symptoms:  Risk of Fracture: None  Risk of Cauda Equina: None  Risk of Infection: None  Risk of Cancer: None  Risk of Ankylosing Spondylitis: Onset at age <35, male, AND morning back stiffness  no                 Review of Systems  Rest of the ROS is Negative except see above and Problem list [stable]        Objective    /78   Pulse 60   Temp 99.2  F (37.3  C)   Resp 18   Ht 1.778 m (5' 10\")   Wt 88 kg (194 lb)   SpO2 96%   BMI 27.84 kg/m    Body mass index is 27.84 kg/m .  Physical Exam   GENERAL: alert and no distress  RESP: lungs clear to auscultation - no rales, rhonchi or wheezes  CV: regular rate and rhythm, normal S1 S2, no S3 or S4, no murmur, click or rub, no peripheral edema  ABDOMEN: soft, nontender, no hepatosplenomegaly, no masses and bowel sounds normal  MS: no gross musculoskeletal defects noted, no edema  NEURO: Normal strength and tone, mentation intact and speech normal  Comprehensive back pain exam:  Tenderness of no tenderness , Pain limits the following motions: has some pain with Flexion , Lower " extremity strength functional and equal on both sides, Lower extremity sensation normal and equal on both sides, and Straight leg raise negative bilaterally    Pt declined Urine Today        Signed Electronically by: Livia Jade MD

## 2024-10-26 ENCOUNTER — HEALTH MAINTENANCE LETTER (OUTPATIENT)
Age: 74
End: 2024-10-26

## 2024-11-13 SDOH — HEALTH STABILITY: PHYSICAL HEALTH: ON AVERAGE, HOW MANY DAYS PER WEEK DO YOU ENGAGE IN MODERATE TO STRENUOUS EXERCISE (LIKE A BRISK WALK)?: 5 DAYS

## 2024-11-13 SDOH — HEALTH STABILITY: PHYSICAL HEALTH: ON AVERAGE, HOW MANY MINUTES DO YOU ENGAGE IN EXERCISE AT THIS LEVEL?: 40 MIN

## 2024-11-13 ASSESSMENT — SOCIAL DETERMINANTS OF HEALTH (SDOH): HOW OFTEN DO YOU GET TOGETHER WITH FRIENDS OR RELATIVES?: ONCE A WEEK

## 2024-11-14 ENCOUNTER — OFFICE VISIT (OUTPATIENT)
Dept: FAMILY MEDICINE | Facility: CLINIC | Age: 74
End: 2024-11-14
Payer: COMMERCIAL

## 2024-11-14 VITALS
TEMPERATURE: 98.1 F | DIASTOLIC BLOOD PRESSURE: 75 MMHG | HEART RATE: 56 BPM | WEIGHT: 198 LBS | SYSTOLIC BLOOD PRESSURE: 136 MMHG | OXYGEN SATURATION: 98 % | HEIGHT: 70 IN | BODY MASS INDEX: 28.35 KG/M2 | RESPIRATION RATE: 16 BRPM

## 2024-11-14 DIAGNOSIS — E78.5 HYPERLIPIDEMIA LDL GOAL <160: ICD-10-CM

## 2024-11-14 DIAGNOSIS — Z00.00 ENCOUNTER FOR MEDICARE ANNUAL WELLNESS EXAM: Primary | ICD-10-CM

## 2024-11-14 LAB
CHOLEST SERPL-MCNC: 243 MG/DL
FASTING STATUS PATIENT QL REPORTED: ABNORMAL
HDLC SERPL-MCNC: 65 MG/DL
LDLC SERPL CALC-MCNC: 167 MG/DL
NONHDLC SERPL-MCNC: 178 MG/DL
TRIGL SERPL-MCNC: 56 MG/DL

## 2024-11-14 PROCEDURE — 80061 LIPID PANEL: CPT | Performed by: FAMILY MEDICINE

## 2024-11-14 PROCEDURE — 90662 IIV NO PRSV INCREASED AG IM: CPT | Performed by: FAMILY MEDICINE

## 2024-11-14 PROCEDURE — 90471 IMMUNIZATION ADMIN: CPT | Performed by: FAMILY MEDICINE

## 2024-11-14 PROCEDURE — G0439 PPPS, SUBSEQ VISIT: HCPCS | Performed by: FAMILY MEDICINE

## 2024-11-14 PROCEDURE — 36415 COLL VENOUS BLD VENIPUNCTURE: CPT | Performed by: FAMILY MEDICINE

## 2024-11-14 NOTE — PATIENT INSTRUCTIONS
Patient Education   Preventive Care Advice   This is general advice given by our system to help you stay healthy. However, your care team may have specific advice just for you. Please talk to your care team about your preventive care needs.  Nutrition  Eat 5 or more servings of fruits and vegetables each day.  Try wheat bread, brown rice and whole grain pasta (instead of white bread, rice, and pasta).  Get enough calcium and vitamin D. Check the label on foods and aim for 100% of the RDA (recommended daily allowance).  Lifestyle  Exercise at least 150 minutes each week  (30 minutes a day, 5 days a week).  Do muscle strengthening activities 2 days a week. These help control your weight and prevent disease.  No smoking.  Wear sunscreen to prevent skin cancer.  Have a dental exam and cleaning every 6 months.  Yearly exams  See your health care team every year to talk about:  Any changes in your health.  Any medicines your care team has prescribed.  Preventive care, family planning, and ways to prevent chronic diseases.  Shots (vaccines)   HPV shots (up to age 26), if you've never had them before.  Hepatitis B shots (up to age 59), if you've never had them before.  COVID-19 shot: Get this shot when it's due.  Flu shot: Get a flu shot every year.  Tetanus shot: Get a tetanus shot every 10 years.  Pneumococcal, hepatitis A, and RSV shots: Ask your care team if you need these based on your risk.  Shingles shot (for age 50 and up)  General health tests  Diabetes screening:  Starting at age 35, Get screened for diabetes at least every 3 years.  If you are younger than age 35, ask your care team if you should be screened for diabetes.  Cholesterol test: At age 39, start having a cholesterol test every 5 years, or more often if advised.  Bone density scan (DEXA): At age 50, ask your care team if you should have this scan for osteoporosis (brittle bones).  Hepatitis C: Get tested at least once in your life.  STIs (sexually  transmitted infections)  Before age 24: Ask your care team if you should be screened for STIs.  After age 24: Get screened for STIs if you're at risk. You are at risk for STIs (including HIV) if:  You are sexually active with more than one person.  You don't use condoms every time.  You or a partner was diagnosed with a sexually transmitted infection.  If you are at risk for HIV, ask about PrEP medicine to prevent HIV.  Get tested for HIV at least once in your life, whether you are at risk for HIV or not.  Cancer screening tests  Cervical cancer screening: If you have a cervix, begin getting regular cervical cancer screening tests starting at age 21.  Breast cancer scan (mammogram): If you've ever had breasts, begin having regular mammograms starting at age 40. This is a scan to check for breast cancer.  Colon cancer screening: It is important to start screening for colon cancer at age 45.  Have a colonoscopy test every 10 years (or more often if you're at risk) Or, ask your provider about stool tests like a FIT test every year or Cologuard test every 3 years.  To learn more about your testing options, visit:   .  For help making a decision, visit:   https://bit.ly/or73553.  Prostate cancer screening test: If you have a prostate, ask your care team if a prostate cancer screening test (PSA) at age 55 is right for you.  Lung cancer screening: If you are a current or former smoker ages 50 to 80, ask your care team if ongoing lung cancer screenings are right for you.  For informational purposes only. Not to replace the advice of your health care provider. Copyright   2023 [facility]. All rights reserved. Clinically reviewed by the [facility]. [employer] 361633 - REV 01/24.  Learning About Activities of Daily Living  What are activities of daily living?     Activities of daily living (ADLs) are the basic self-care tasks you do every day. These include eating, bathing, dressing,  and moving around.  As you age, and if you have health problems, you may find that it's harder to do some of these tasks. If so, your doctor can suggest ideas that may help.  To measure what kind of help you may need, your doctor will ask how well you are able to do ADLs. Let your doctor know if there are any tasks that you are having trouble doing. This is an important first step to getting help. And when you have the help you need, you can stay as independent as possible.  How will a doctor assess your ADLs?  Asking about ADLs is part of a routine health checkup your doctor will likely do as you age. Your health check might be done in a doctor's office, in your home, or at a hospital. The goal is to find out if you are having any problems that could make it hard to care for yourself or that make it unsafe for you to be on your own.  To measure your ADLs, your doctor will ask how hard it is for you to do routine tasks. Your doctor may also want to know if you have changed the way you do a task because of a health problem. Your doctor may watch how you:  Walk back and forth.  Keep your balance while you stand or walk.  Move from sitting to standing or from a bed to a chair.  Button or unbutton a shirt or sweater.  Remove and put on your shoes.  It's common to feel a little worried or anxious if you find you can't do all the things you used to be able to do. Talking with your doctor about ADLs is a way to make sure you're as safe as possible and able to care for yourself as well as you can. You may want to bring a caregiver, friend, or family member to your checkup. They can help you talk to your doctor.  Follow-up care is a key part of your treatment and safety. Be sure to make and go to all appointments, and call your doctor if you are having problems. It's also a good idea to know your test results and keep a list of the medicines you take.  Current as of: October 24, 2023  Content Version: 14.2 2024 [facility]  [place].   Care instructions adapted under license by your healthcare professional. If you have questions about a medical condition or this instruction, always ask your healthcare professional. [employer] disclaims any warranty or liability for your use of this information.    Hearing Loss: Care Instructions  Overview     Hearing loss is a sudden or slow decrease in how well you hear. It can range from slight to profound. Permanent hearing loss can occur with aging. It also can happen when you are exposed long-term to loud noise. Examples include listening to loud music, riding motorcycles, or being around other loud machines.  Hearing loss can affect your work and home life. It can make you feel lonely or depressed. You may feel that you have lost your independence. But hearing aids and other devices can help you hear better and feel connected to others.  Follow-up care is a key part of your treatment and safety. Be sure to make and go to all appointments, and call your doctor if you are having problems. It's also a good idea to know your test results and keep a list of the medicines you take.  How can you care for yourself at home?  Avoid loud noises whenever possible. This helps keep your hearing from getting worse.  Always wear hearing protection around loud noises.  Wear a hearing aid as directed.  A professional can help you pick a hearing aid that will work best for you.  You can also get hearing aids over the counter for mild to moderate hearing loss.  Have hearing tests as your doctor suggests. They can show whether your hearing has changed. Your hearing aid may need to be adjusted.  Use other devices as needed. These may include:  Telephone amplifiers and hearing aids that can connect to a television, stereo, radio, or microphone.  Devices that use lights or vibrations. These alert you to the doorbell, a ringing telephone, or a baby monitor.  Television closed-captioning. This shows  "the words at the bottom of the screen. Most new TVs can do this.  TTY (text telephone). This lets you type messages back and forth on the telephone instead of talking or listening. These devices are also called TDD. When messages are typed on the keyboard, they are sent over the phone line to a receiving TTY. The message is shown on a monitor.  Use text messaging, social media, and email if it is hard for you to communicate by telephone.  Try to learn a listening technique called speechreading. It is not lipreading. You pay attention to people's gestures, expressions, posture, and tone of voice. These clues can help you understand what a person is saying. Face the person you are talking to, and have them face you. Make sure the lighting is good. You need to see the other person's face clearly.  Think about counseling if you need help to adjust to your hearing loss.  When should you call for help?  Watch closely for changes in your health, and be sure to contact your doctor if:    You think your hearing is getting worse.     You have new symptoms, such as dizziness or nausea.   Where can you learn more?  Go to https://www.[facility].net/patiented  Enter R798 in the search box to learn more about \"Hearing Loss: Care Instructions.\"  Current as of: September 27, 2023  Content Version: 14.2 2024 [employer].   Care instructions adapted under license by your healthcare professional. If you have questions about a medical condition or this instruction, always ask your healthcare professional. [employer] disclaims any warranty or liability for your use of this information.       "

## 2024-11-14 NOTE — PROGRESS NOTES
"Preventive Care Visit  [facility]  Nov 14, 2024      Assessment & Plan   Problem List Items Addressed This Visit       Hyperlipidemia LDL goal <160 - Primary    Relevant Orders    Lipid panel reflex to direct LDL Fasting     Other Visit Diagnoses       Encounter for Medicare annual wellness exam                 Patient has been advised of split billing requirements and indicates understanding: Yes       BMI  Estimated body mass index is 28.41 kg/m  as calculated from the following:    Height as of this encounter: 1.778 m (5' 10\").    Weight as of this encounter: 89.8 kg (198 lb).       Counseling  Appropriate preventive services were addressed with this patient via screening, questionnaire, or discussion as appropriate for fall prevention, nutrition, physical activity, Tobacco-use cessation, social engagement, weight loss and cognition.  Checklist reviewing preventive services available has been given to the patient.  Reviewed patient's diet, addressing concerns and/or questions.   The patient was instructed to see the dentist every 6 months.   Patient reported safety concerns were addressed today.The patient was provided with written information regarding signs of hearing loss.           Shaw Cornejo is a 74 year old, presenting for the following:  Physical        11/14/2024     8:04 AM   Additional Questions   Roomed by Najma MCGILL CMA           HPI          Health Care Directive  Patient does not have a Health Care Directive: Discussed advance care planning with patient; information given to patient to review.      11/13/2024   General Health   How would you rate your overall physical health? Good   Feel stress (tense, anxious, or unable to sleep) Only a little      (!) STRESS CONCERN      11/13/2024   Nutrition   Diet: Regular (no restrictions)            11/13/2024   Exercise   Days per week of moderate/strenous exercise 5 days   Average minutes " spent exercising at this level 40 min            11/13/2024   Social Factors   Frequency of gathering with friends or relatives Once a week   Worry food won't last until get money to buy more No   Food not last or not have enough money for food? No   Do you have housing? (Housing is defined as stable permanent housing and does not include staying ouside in a car, in a tent, in an abandoned building, in an overnight shelter, or couch-surfing.) Yes   Are you worried about losing your housing? No   Lack of transportation? No   Unable to get utilities (heat,electricity)? No            11/13/2024   Fall Risk   Fallen 2 or more times in the past year? No    Trouble with walking or balance? No        Patient-reported          11/13/2024   Activities of Daily Living- Home Safety   Needs help with the following daily activites None of the above   Safety concerns in the home No grab bars in the bathroom            11/13/2024   Dental   Dentist two times every year? (!) NO            11/13/2024   Hearing Screening   Hearing concerns? (!) I NEED TO ASK PEOPLE TO SPEAK UP OR REPEAT THEMSELVES.    (!) IT'S HARD TO FOLLOW A CONVERSATION IN A NOISY RESTAURANT OR CROWDED ROOM.       Multiple values from one day are sorted in reverse-chronological order         11/13/2024   Driving Risk Screening   Patient/family members have concerns about driving No            11/13/2024   General Alertness/Fatigue Screening   Have you been more tired than usual lately? No            11/13/2024   Urinary Incontinence Screening   Bothered by leaking urine in past 6 months No            11/13/2024   TB Screening   Were you born outside of the [place]? No            Today's PHQ-2 Score:       11/13/2024     2:37 PM   PHQ-2 ( 1999 [employer])   Q1: Little interest or pleasure in doing things 0    Q2: Feeling down, depressed or hopeless 0    PHQ-2 Score 0    Q1: Little interest or pleasure in doing things Not at all   Q2: Feeling down, depressed or hopeless Not  at all   PHQ-2 Score 0       Patient-reported           11/13/2024   Substance Use   Alcohol more than 3/day or more than 7/wk No   Do you have a current opioid prescription? No   How severe/bad is pain from 1 to 10? 0/10 (No Pain)   Do you use any other substances recreationally? No        Social History     Tobacco Use    Smoking status: Never     Passive exposure: Never    Smokeless tobacco: Never   Vaping Use    Vaping status: Never Used   Substance Use Topics    Alcohol use: Yes     Alcohol/week: 0.0 standard drinks of alcohol     Comment: moderate use in the past    Drug use: No       ASCVD Risk   The 10-year ASCVD risk score (Liz SCHULZ, et al., 2019) is: 28.8%    Values used to calculate the score:      Age: 74 years      Sex: Male      Is Non- : No      Diabetic: No      Tobacco smoker: No      Systolic Blood Pressure: 136 mmHg      Is BP treated: No      HDL Cholesterol: 62 mg/dL      Total Cholesterol: 252 mg/dL            Reviewed and updated as needed this visit by Provider                      Current providers sharing in care for this patient include:  Patient Care Team:  Clinic - [facility] as PCP - Angeli Nevarez DPM as Assigned Surgical Provider  Livia Jade MD as Assigned PCP    The following health maintenance items are reviewed in Epic and correct as of today:  Health Maintenance   Topic Date Due    ZOSTER IMMUNIZATION (1 of 2) Never done    Pneumococcal Vaccine: 65+ Years (2 of 2 - PPSV23 or PCV20) 03/26/2019    LIPID  07/20/2023    RSV VACCINE (1 - 1-dose 75+ series) Never done    INFLUENZA VACCINE (1) 09/01/2024    COVID-19 Vaccine (6 - 2024-25 season) 09/01/2024    ANNUAL REVIEW OF HM ORDERS  06/19/2025    GLUCOSE  07/20/2025    MEDICARE ANNUAL WELLNESS VISIT  11/14/2025    FALL RISK ASSESSMENT  11/14/2025    ADVANCE CARE PLANNING  07/21/2027    DTAP/TDAP/TD IMMUNIZATION (3 - Td or Tdap) 07/20/2032    HEPATITIS C SCREENING   "Completed    PHQ-2 (once per calendar year)  Completed    HPV IMMUNIZATION  Aged Out    MENINGITIS IMMUNIZATION  Aged Out    RSV MONOCLONAL ANTIBODY  Aged Out    COLORECTAL CANCER SCREENING  Discontinued         Review of Systems  Constitutional, HEENT, cardiovascular, pulmonary, gi and gu systems are negative, except as otherwise noted.     Objective    Exam  /75 (BP Location: Left arm, Patient Position: Chair, Cuff Size: Adult Large)   Pulse 56   Temp 98.1  F (36.7  C) (Temporal)   Resp 16   Ht 1.778 m (5' 10\")   Wt 89.8 kg (198 lb)   SpO2 98%   BMI 28.41 kg/m     Estimated body mass index is 28.41 kg/m  as calculated from the following:    Height as of this encounter: 1.778 m (5' 10\").    Weight as of this encounter: 89.8 kg (198 lb).    Physical Exam  GENERAL: alert and no distress  NECK: no adenopathy, no asymmetry, masses, or scars  RESP: lungs clear to auscultation - no rales, rhonchi or wheezes  CV: regular rate and rhythm, normal S1 S2, no S3 or S4, no murmur, click or rub, no peripheral edema  ABDOMEN: soft, nontender, no hepatosplenomegaly, no masses and bowel sounds normal  MS: no gross musculoskeletal defects noted, no edema        11/14/2024   Mini Cog   Clock Draw Score 2 Normal   3 Item Recall 3 objects recalled   Mini Cog Total Score 5                 Signed Electronically by: SUSAN ROTHMAN DO    "